# Patient Record
Sex: MALE | Race: WHITE | Employment: FULL TIME | URBAN - METROPOLITAN AREA
[De-identification: names, ages, dates, MRNs, and addresses within clinical notes are randomized per-mention and may not be internally consistent; named-entity substitution may affect disease eponyms.]

---

## 2020-06-02 ENCOUNTER — TELEPHONE (OUTPATIENT)
Dept: FAMILY MEDICINE CLINIC | Age: 45
End: 2020-06-02

## 2020-06-02 NOTE — TELEPHONE ENCOUNTER
----- Message from Doug Underwood sent at 6/2/2020  4:22 PM EDT -----  Regarding: /Telephone  Appointment not available    Caller's first and last name and relationship to patient (if not the patient):      Best contact number:  [phone]    Preferred date and time:  Tuesday or Thursday. ..early afternoon    Scheduled appointment date and time:  No appt scheduled    Reason for appointment:  New pt appt, would like a face to face visit    Details to clarify the request:  Pt states they have a hernia    Kaushik Underwood

## 2020-06-08 ENCOUNTER — OFFICE VISIT (OUTPATIENT)
Dept: FAMILY MEDICINE CLINIC | Age: 45
End: 2020-06-08

## 2020-06-08 VITALS
WEIGHT: 315 LBS | HEIGHT: 76 IN | HEART RATE: 94 BPM | RESPIRATION RATE: 20 BRPM | SYSTOLIC BLOOD PRESSURE: 145 MMHG | OXYGEN SATURATION: 95 % | TEMPERATURE: 97.6 F | BODY MASS INDEX: 38.36 KG/M2 | DIASTOLIC BLOOD PRESSURE: 92 MMHG

## 2020-06-08 DIAGNOSIS — Z13.1 DIABETES MELLITUS SCREENING: ICD-10-CM

## 2020-06-08 DIAGNOSIS — Z83.3 FAMILY HISTORY OF DIABETES MELLITUS (DM): ICD-10-CM

## 2020-06-08 DIAGNOSIS — R60.9 EDEMA, UNSPECIFIED TYPE: ICD-10-CM

## 2020-06-08 DIAGNOSIS — R63.5 WEIGHT GAIN: ICD-10-CM

## 2020-06-08 DIAGNOSIS — R43.0 LOSS OF SMELL: ICD-10-CM

## 2020-06-08 DIAGNOSIS — I10 ESSENTIAL HYPERTENSION: ICD-10-CM

## 2020-06-08 DIAGNOSIS — I10 ESSENTIAL HYPERTENSION: Primary | ICD-10-CM

## 2020-06-08 DIAGNOSIS — E66.01 OBESITY, MORBID (HCC): ICD-10-CM

## 2020-06-08 DIAGNOSIS — Z13.220 LIPID SCREENING: ICD-10-CM

## 2020-06-08 DIAGNOSIS — K42.9 UMBILICAL HERNIA WITHOUT OBSTRUCTION AND WITHOUT GANGRENE: ICD-10-CM

## 2020-06-08 RX ORDER — HYDROCHLOROTHIAZIDE 25 MG/1
25 TABLET ORAL DAILY
Qty: 30 TAB | Refills: 2 | Status: SHIPPED | OUTPATIENT
Start: 2020-06-08 | End: 2020-07-29

## 2020-06-08 NOTE — LETTER
NOTIFICATION RETURN TO WORK / SCHOOL 
 
6/8/2020 4:29 PM 
 
Mr. Bhavesh Wells 
[address] [facility] [address] To Whom It May Concern: 
 
Bhavesh Wells is currently under the care of Paco Taylor. He will return to work/school on: Tuesday, 6/09/2020. Please excuse from work today for doctor's appointment. If there are questions or concerns please have the patient contact our office. Sincerely, Rashida Mccray PA-C

## 2020-06-08 NOTE — PATIENT INSTRUCTIONS
Learning About the [address] [facility] Diet What is the Mediterranean diet? The Mediterranean diet is a style of eating rather than a diet plan. It features foods eaten in [place], [place], [place] and [place], and other countries along the [facility]. It emphasizes eating foods like fish, fruits, vegetables, beans, high-fiber breads and whole grains, nuts, and olive oil. This style of eating includes limited red meat, cheese, and sweets. Why choose the Mediterranean diet? A Mediterranean-style diet may improve heart health. It contains more fat than other heart-healthy diets. But the fats are mainly from nuts, unsaturated oils (such as fish oils and olive oil), and certain nut or seed oils (such as canola, soybean, or flaxseed oil). These fats may help protect the heart and blood vessels. How can you get started on the Mediterranean diet? Here are some things you can do to switch to a more Mediterranean way of eating. What to eat · Eat a variety of fruits and vegetables each day, such as grapes, blueberries, tomatoes, broccoli, peppers, figs, olives, spinach, eggplant, beans, lentils, and chickpeas. · Eat a variety of whole-grain foods each day, such as oats, brown rice, and whole wheat bread, pasta, and couscous. · Eat fish at least 2 times a week. Try tuna, salmon, mackerel, lake trout, herring, or sardines. · Eat moderate amounts of low-fat dairy products, such as milk, cheese, or yogurt. · Eat moderate amounts of poultry and eggs. · Choose healthy (unsaturated) fats, such as nuts, olive oil, and certain nut or seed oils like canola, soybean, and flaxseed. · Limit unhealthy (saturated) fats, such as butter, palm oil, and coconut oil. And limit fats found in animal products, such as meat and dairy products made with whole milk. Try to eat red meat only a few times a month in very small amounts. · Limit sweets and desserts to only a few times a week.  This includes sugar-sweetened drinks like soda. The Mediterranean diet may also include red wine with your meal1 glass each day for women and up to 2 glasses a day for men. Tips for eating at home · Use herbs, spices, garlic, lemon zest, and citrus juice instead of salt to add flavor to foods. · Add avocado slices to your sandwich instead of donaldson. · Have fish for lunch or dinner instead of red meat. Brush the fish with olive oil, and broil or grill it. · Sprinkle your salad with seeds or nuts instead of cheese. · Cook with olive or canola oil instead of butter or oils that are high in saturated fat. · Switch from 2% milk or whole milk to 1% or fat-free milk. · Dip raw vegetables in a vinaigrette dressing or hummus instead of dips made from mayonnaise or sour cream. 
· Have a piece of fruit for dessert instead of a piece of cake. Try baked apples, or have some dried fruit. Tips for eating out · Try broiled, grilled, baked, or poached fish instead of having it fried or breaded. · Ask your  to have your meals prepared with olive oil instead of butter. · Order dishes made with marinara sauce or sauces made from olive oil. Avoid sauces made from cream or mayonnaise. · Choose whole-grain breads, whole wheat pasta and pizza crust, brown rice, beans, and lentils. · Cut back on butter or margarine on bread. Instead, you can dip your bread in a small amount of olive oil. · Ask for a side salad or grilled vegetables instead of french fries or chips. Where can you learn more? Go to http://socorro-kurt.info/ Enter [phone] in the search box to learn more about \"Learning About the Mediterranean Diet. \" Current as of: August 22, 2019               Content Version: 12.5 © 0329-1763 Healthwise, Incorporated. Care instructions adapted under license by [employer] (which disclaims liability or warranty for this information).  If you have questions about a medical condition or this instruction, always ask your healthcare professional. [address] any warranty or liability for your use of this information. Low Sodium Diet (2,000 Milligram): Care Instructions Your Care Instructions Too much sodium causes your body to hold on to extra water. This can raise your blood pressure and force your heart and kidneys to work harder. In very serious cases, this could cause you to be put in the hospital. It might even be life-threatening. By limiting sodium, you will feel better and lower your risk of serious problems. The most common source of sodium is salt. People get most of the salt in their diet from canned, prepared, and packaged foods. Fast food and restaurant meals also are very high in sodium. Your doctor will probably limit your sodium to less than 2,000 milligrams (mg) a day. This limit counts all the sodium in prepared and packaged foods and any salt you add to your food. Follow-up care is a key part of your treatment and safety. Be sure to make and go to all appointments, and call your doctor if you are having problems. It's also a good idea to know your test results and keep a list of the medicines you take. How can you care for yourself at home? Read food labels · Read labels on cans and food packages. The labels tell you how much sodium is in each serving. Make sure that you look at the serving size. If you eat more than the serving size, you have eaten more sodium. · Food labels also tell you the Percent Daily Value for sodium. Choose products with low Percent Daily Values for sodium. · Be aware that sodium can come in forms other than salt, including monosodium glutamate (MSG), sodium citrate, and sodium bicarbonate (baking soda). MSG is often added to Asian food. When you eat out, you can sometimes ask for food without MSG or added salt. Buy low-sodium foods · Buy foods that are labeled \"unsalted\" (no salt added), \"sodium-free\" (less than 5 mg of sodium per serving), or \"low-sodium\" (less than 140 mg of sodium per serving). Foods labeled \"reduced-sodium\" and \"light sodium\" may still have too much sodium. Be sure to read the label to see how much sodium you are getting. · Buy fresh vegetables, or frozen vegetables without added sauces. Buy low-sodium versions of canned vegetables, soups, and other canned goods. Prepare low-sodium meals · Cut back on the amount of salt you use in cooking. This will help you adjust to the taste. Do not add salt after cooking. One teaspoon of salt has about 2,300 mg of sodium. · Take the salt shaker off the table. · Flavor your food with garlic, lemon juice, onion, vinegar, herbs, and spices. Do not use soy sauce, lite soy sauce, steak sauce, onion salt, garlic salt, celery salt, mustard, or ketchup on your food. · Use low-sodium salad dressings, sauces, and ketchup. Or make your own salad dressings and sauces without adding salt. · Use less salt (or none) when recipes call for it. You can often use half the salt a recipe calls for without losing flavor. Other foods such as rice, pasta, and grains do not need added salt. · Rinse canned vegetables, and cook them in fresh water. This removes somebut not allof the salt. · Avoid water that is naturally high in sodium or that has been treated with water softeners, which add sodium. Call your local water company to find out the sodium content of your water supply. If you buy bottled water, read the label and choose a sodium-free brand. Avoid high-sodium foods · Avoid eating: 
? Smoked, cured, salted, and canned meat, fish, and poultry. ? Ham, donaldson, hot dogs, and luncheon meats. ? Regular, hard, and processed cheese and regular peanut butter. ? Crackers with salted tops, and other salted snack foods such as pretzels, chips, and salted popcorn. ? Frozen prepared meals, unless labeled low-sodium. ? Canned and dried soups, broths, and bouillon, unless labeled sodium-free or low-sodium. ? Canned vegetables, unless labeled sodium-free or low-sodium. ? [place] Serena fries, pizza, tacos, and other fast foods. ? Pickles, olives, ketchup, and other condiments, especially soy sauce, unless labeled sodium-free or low-sodium. Where can you learn more? Go to http://socorro-kurt.info/ Enter Z918 in the search box to learn more about \"Low Sodium Diet (2,000 Milligram): Care Instructions. \" Current as of: August 22, 2019               Content Version: 12.5 © 2464-6748 Healthwise, Incorporated. Care instructions adapted under license by [employer] (which disclaims liability or warranty for this information). If you have questions about a medical condition or this instruction, always ask your healthcare professional. [address] any warranty or liability for your use of this information.

## 2020-06-08 NOTE — PROGRESS NOTES
HPI:  40 y.o.  presents as new patient to establish care. No prior PCP. Has not had physical or labs any many years. He was born in [place] and lives in [place]. He is  and has 3 children, ages 22, 23, and 15. 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 Does not follow a particular diet. Does walking around his property, owns quite a few acres. Weight gain - noted over the last year, feels fullness under arms, lateral thorax, and in stomach,  on scale at home noticed about 50 lbs in the last 1 - 1.5 yrs. States has reduced his soft drinks and is trying to eat healthier over the last month, used to drink 6 - 12-ounce cans of real Coke per day, now may have 1 daily. Is drinking sugar-free Powerade, some water daily    Hernia - noticed his navel \"popped out\" one yr ago, and is painful when he picks up things or when touched; no prior abdominal surgeries    Feels bloated in his stomach. No gas. No ankle swelling at first ask, but on further discussion he does notice a \"dent\" in his lower legs from his sock line when he takes his shoes off at end of day. His clothes feel tighter. Noted 1 year ago had bad URI and laryngitis, and gradually lost sense of smell. Does not use any nasal sprays or zinc preparations. No known h/o thyroid disease. HTN - states he saw an MD in [place], Dr Ruthie Lindsay ?, a few yrs ago who diagnosed him with HTN, believes he took lisinopril briefly, but just didn't follow up; no CP or CARR on walking around his property; no orthopnea    He suspects his wife uses significant salt when she cooks, and he likes to add salt at the table as well. He will eat out about 3 times per week. Patient Active Problem List    Diagnosis    Obesity, morbid ([address]         Past Medical History:   Diagnosis Date    Hypertension        Social History     Tobacco Use    Smoking status: Never Smoker    Smokeless tobacco: Never Used   Substance Use Topics    Alcohol use:  Yes Alcohol/week: 1.0 standard drinks     Types: 1 Cans of beer per week     Frequency: Monthly or less     Drinks per session: 1 or 2     Binge frequency: Never     Comment: once in a blue moon    Drug use: No           No Known Allergies    ROS:  ROS negative except as per HPI. PE:  Visit Vitals  BP (!) 143/95 (BP 1 Location: Right arm, BP Patient Position: Sitting)   Pulse 94   Temp 97.6 °F (36.4 °C) (Temporal)   Resp 20   Ht 6' 4\" (1.93 m)   Wt (!) 352 lb (159.7 kg)   SpO2 95%   BMI 42.85 kg/m²     Gen: alert, oriented, no acute distress  Head: normocephalic, atraumatic  Ears: external auditory canals clear, TMs without erythema or effusion  Eyes: pupils equal round reactive to light, sclera clear, conjunctiva clear  Oral: moist mucus membranes, no oral lesions, no pharyngeal inflammation or exudate  Neck: symmetric normal sized thyroid, no carotid bruits, no jugular vein distention  Resp: no increase work of breathing, lungs clear to ausculation bilaterally, no wheezing, rales or rhonchi  CV: S1, S2 normal.  No murmurs, rubs, or gallops. Chest wall: bilateral skin folds under axillary area, no masses or cysts  Abd: soft, not tender, not distended. No hepatosplenomegaly. Normal bowel sounds. (+)umbilical hernia, tender, reducible     Neuro:  normal strength and movement in all extremities. Skin: no lesion or rash  Extremities: mild bilateral 1/2+ pitting ankle edema    No results found for this visit on 06/08/20. Assessment/Plan:    1. Essential hypertension    - METABOLIC PANEL, COMPREHENSIVE; Future  - CBC WITH AUTOMATED DIFF; Future  - LIPID PANEL; Future  - URINALYSIS W/MICROSCOPIC; Future  - XR CHEST PA LAT; Future  - hydroCHLOROthiazide (HYDRODIURIL) 25 mg tablet; Take 1 Tab by mouth daily. Dispense: 30 Tab;  Refill: 2  145/92  -has been on lisinopril in the past, but with current edema, will try HCTZ instead  -advised need labs within next couple months or can not continue to prescribe, as I have no baseline labs for review currently    2. Obesity, morbid ([address]  -notes 50 lb weight gain in 1-1.5yrs  -has cut out the 6 - 12 ounce real Cokes he would have daily, now drinking no more than 1 daily  -reviewed Mediterranean diet, handout given  -increase water intake  -consider referral to nutritionist  - TSH 3RD GENERATION; Future  - T4, FREE; Future  - TESTOSTERONE, FREE & TOTAL; Future  - HEMOGLOBIN A1C WITH EAG; Future  - VITAMIN D, 25 HYDROXY; Future    3. Edema, unspecified type  -diet sounds high in salt  -reviewed reading nutrition labels for sodium  -low salt diet handout given  -no CP, CARR, or orthopnea  -consider echo  - VITAMIN D, 25 HYDROXY; Future  - URINALYSIS W/MICROSCOPIC; Future  - XR CHEST PA LAT; Future  - hydroCHLOROthiazide (HYDRODIURIL) 25 mg tablet; Take 1 Tab by mouth daily. Dispense: 30 Tab; Refill: 2    4. Weight gain    - HEMOGLOBIN A1C WITH EAG; Future  - XR CHEST PA LAT; Future  - TSH 3RD GENERATION; Future    5. Loss of smell  Started about 1 yr ago after an URI    - TSH 3RD GENERATION; Future  - T4, FREE; Future  - TESTOSTERONE, FREE & TOTAL; Future  - VITAMIN D, 25 HYDROXY; Future  - XR CHEST PA LAT; Future    6. Family history of diabetes mellitus (DM)    - HEMOGLOBIN A1C WITH EAG; Future    7. Umbilical hernia without obstruction and without gangrene  -reviewed warning signs to go to the ER for, increase pain and/or unable to reduce  -no heavy lifting  - REFERRAL TO GENERAL SURGERY    8. Lipid screening  - LIPID PANEL; Future    9. Diabetes mellitus screening    - HEMOGLOBIN A1C WITH EAG; Future      Health Maintenance reviewed - updated. Medications Discontinued During This Encounter   Medication Reason    HYDROcodone-acetaminophen (NORCO) 5-325 mg per tablet Not A Current Medication         Follow-up and Dispositions    · Return in about 3 weeks (around 6/29/2020) for HTN. Recommended healthy diet low in carbohydrates, fats, sodium and cholesterol. Recommended regular cardiovascular exercise 3-6 times per week for 30-60 minutes daily. Verbal and written instructions (see AVS) provided. Patient expresses understanding of diagnosis and treatment plan.

## 2020-06-08 NOTE — PROGRESS NOTES
Chantal Hendricks. is a 40 y.o. male  HIPAA verified by two patient identifiers. Health Maintenance Due   Topic    DTaP/Tdap/Td series (1 - Tdap)    Lipid Screen      Chief Complaint   Patient presents with    Abdominal Pain     tender to touch,fluid build up of fluid under bilateral arms,loss sence of smell about one year ago     Visit Vitals  BP (!) 159/102 (BP 1 Location: Left arm, BP Patient Position: Sitting)   Pulse 94   Temp 97.6 °F (36.4 °C) (Temporal)   Resp 20   Ht 6' 4\" (1.93 m)   Wt (!) 352 lb (159.7 kg)   SpO2 95%   BMI 42.85 kg/m²       Pain Scale: 5/10  Pain Location: Abdomen  1. Have you been to the ER, urgent care clinic since your last visit? Hospitalized since your last visit? No    2. Have you seen or consulted any other health care providers outside of the [address] [place] [address] since your last visit? Include any pap smears or colon screening.  No

## 2020-10-02 DIAGNOSIS — R60.9 EDEMA, UNSPECIFIED TYPE: ICD-10-CM

## 2020-10-02 DIAGNOSIS — I10 ESSENTIAL HYPERTENSION: ICD-10-CM

## 2020-10-09 RX ORDER — HYDROCHLOROTHIAZIDE 25 MG/1
25 TABLET ORAL DAILY
Qty: 30 TAB | Refills: 0 | Status: SHIPPED | OUTPATIENT
Start: 2020-10-09 | End: 2020-11-04

## 2020-10-09 NOTE — TELEPHONE ENCOUNTER
Office visit/labs due before next refill  Please assist, lab orders are in the computer, can sched fasting lab visit here with in person visit 1 wk later

## 2020-11-10 NOTE — TELEPHONE ENCOUNTER
The patient scheduled his lab appointment for 11/20/2020 at 4pm but said he will call back  To schedule his in office visit due to his work schedule.

## 2020-11-14 DIAGNOSIS — R60.9 EDEMA, UNSPECIFIED TYPE: ICD-10-CM

## 2020-11-14 DIAGNOSIS — I10 ESSENTIAL HYPERTENSION: ICD-10-CM

## 2020-11-16 RX ORDER — HYDROCHLOROTHIAZIDE 25 MG/1
TABLET ORAL
Qty: 15 TAB | Refills: 0 | Status: SHIPPED | OUTPATIENT
Start: 2020-11-16 | End: 2020-11-30

## 2020-11-27 DIAGNOSIS — R60.9 EDEMA, UNSPECIFIED TYPE: ICD-10-CM

## 2020-11-27 DIAGNOSIS — I10 ESSENTIAL HYPERTENSION: ICD-10-CM

## 2020-11-30 RX ORDER — HYDROCHLOROTHIAZIDE 25 MG/1
TABLET ORAL
Qty: 15 TAB | Refills: 0 | Status: SHIPPED | OUTPATIENT
Start: 2020-11-30 | End: 2020-12-16 | Stop reason: SDUPTHER

## 2020-11-30 NOTE — TELEPHONE ENCOUNTER
Please schedule an in person office visit to review labs and for BP check. It looks like he has lab appt coming up 12/04/20, but needs visit soon after that as well. Thanks!

## 2020-12-07 LAB
25(OH)D3 SERPL-MCNC: 15.5 NG/ML (ref 30–100)
ALBUMIN SERPL-MCNC: 4.1 G/DL (ref 3.5–5)
ALBUMIN/GLOB SERPL: 1.1 {RATIO} (ref 1.1–2.2)
ALP SERPL-CCNC: 99 U/L (ref 45–117)
ALT SERPL-CCNC: 63 U/L (ref 12–78)
ANION GAP SERPL CALC-SCNC: 7 MMOL/L (ref 5–15)
AST SERPL-CCNC: 24 U/L (ref 15–37)
BASOPHILS # BLD: 0.1 K/UL (ref 0–0.1)
BASOPHILS NFR BLD: 1 % (ref 0–1)
BILIRUB SERPL-MCNC: 0.5 MG/DL (ref 0.2–1)
BUN SERPL-MCNC: 19 MG/DL (ref 6–20)
BUN/CREAT SERPL: 18 (ref 12–20)
CALCIUM SERPL-MCNC: 9.3 MG/DL (ref 8.5–10.1)
CHLORIDE SERPL-SCNC: 104 MMOL/L (ref 97–108)
CHOLEST SERPL-MCNC: 212 MG/DL
CO2 SERPL-SCNC: 29 MMOL/L (ref 21–32)
CREAT SERPL-MCNC: 1.06 MG/DL (ref 0.7–1.3)
DIFFERENTIAL METHOD BLD: ABNORMAL
EOSINOPHIL # BLD: 0.2 K/UL (ref 0–0.4)
EOSINOPHIL NFR BLD: 3 % (ref 0–7)
ERYTHROCYTE [DISTWIDTH] IN BLOOD BY AUTOMATED COUNT: 12.8 % (ref 11.5–14.5)
EST. AVERAGE GLUCOSE BLD GHB EST-MCNC: 160 MG/DL
GLOBULIN SER CALC-MCNC: 3.6 G/DL (ref 2–4)
GLUCOSE SERPL-MCNC: 149 MG/DL (ref 65–100)
HBA1C MFR BLD: 7.2 % (ref 4–5.6)
HCT VFR BLD AUTO: 44.2 % (ref 36.6–50.3)
HDLC SERPL-MCNC: 37 MG/DL
HDLC SERPL: 5.7 {RATIO} (ref 0–5)
HGB BLD-MCNC: 15 G/DL (ref 12.1–17)
IMM GRANULOCYTES # BLD AUTO: 0 K/UL (ref 0–0.04)
IMM GRANULOCYTES NFR BLD AUTO: 0 % (ref 0–0.5)
LDLC SERPL CALC-MCNC: 140 MG/DL (ref 0–100)
LIPID PROFILE,FLP: ABNORMAL
LYMPHOCYTES # BLD: 1.8 K/UL (ref 0.8–3.5)
LYMPHOCYTES NFR BLD: 21 % (ref 12–49)
MCH RBC QN AUTO: 26.5 PG (ref 26–34)
MCHC RBC AUTO-ENTMCNC: 33.9 G/DL (ref 30–36.5)
MCV RBC AUTO: 78 FL (ref 80–99)
MONOCYTES # BLD: 0.8 K/UL (ref 0–1)
MONOCYTES NFR BLD: 9 % (ref 5–13)
NEUTS SEG # BLD: 5.5 K/UL (ref 1.8–8)
NEUTS SEG NFR BLD: 66 % (ref 32–75)
NRBC # BLD: 0 K/UL (ref 0–0.01)
NRBC BLD-RTO: 0 PER 100 WBC
PLATELET # BLD AUTO: 277 K/UL (ref 150–400)
PMV BLD AUTO: 10.7 FL (ref 8.9–12.9)
POTASSIUM SERPL-SCNC: 4 MMOL/L (ref 3.5–5.1)
PROT SERPL-MCNC: 7.7 G/DL (ref 6.4–8.2)
RBC # BLD AUTO: 5.67 M/UL (ref 4.1–5.7)
SODIUM SERPL-SCNC: 140 MMOL/L (ref 136–145)
T4 FREE SERPL-MCNC: 1 NG/DL (ref 0.8–1.5)
TESTOST FREE SERPL-MCNC: 8.6 PG/ML (ref 6.8–21.5)
TESTOST SERPL-MCNC: 107 NG/DL (ref 264–916)
TRIGL SERPL-MCNC: 175 MG/DL (ref ?–150)
TSH SERPL DL<=0.05 MIU/L-ACNC: 1.52 UIU/ML (ref 0.36–3.74)
VLDLC SERPL CALC-MCNC: 35 MG/DL
WBC # BLD AUTO: 8.3 K/UL (ref 4.1–11.1)

## 2020-12-16 DIAGNOSIS — R60.9 EDEMA, UNSPECIFIED TYPE: ICD-10-CM

## 2020-12-16 DIAGNOSIS — I10 ESSENTIAL HYPERTENSION: ICD-10-CM

## 2020-12-16 RX ORDER — HYDROCHLOROTHIAZIDE 25 MG/1
TABLET ORAL
Qty: 90 TAB | Refills: 0 | Status: SHIPPED | OUTPATIENT
Start: 2020-12-16 | End: 2021-03-01 | Stop reason: SDUPTHER

## 2020-12-17 NOTE — PROGRESS NOTES
[employer] message sent  I will discuss your lab results in detail at your upcoming visit. Of note, your blood sugar is elevated in the range of diabetes. I will send a separate message with dietary tips to follow until your appointment, and review starting medication at your appointment. The cholesterol is elevated. The vitamin D level and testosterone are low. You are about to run out of your HCTZ blood pressure medication, so I have sent a refill to your pharmacy. Your electrolytes and kidney function are normal.     I look forward to seeing you at your visit.

## 2021-01-11 ENCOUNTER — OFFICE VISIT (OUTPATIENT)
Dept: FAMILY MEDICINE CLINIC | Age: 46
End: 2021-01-11
Payer: COMMERCIAL

## 2021-01-11 VITALS
WEIGHT: 315 LBS | DIASTOLIC BLOOD PRESSURE: 89 MMHG | HEIGHT: 75 IN | OXYGEN SATURATION: 95 % | RESPIRATION RATE: 16 BRPM | BODY MASS INDEX: 39.17 KG/M2 | HEART RATE: 80 BPM | SYSTOLIC BLOOD PRESSURE: 153 MMHG | TEMPERATURE: 97.5 F

## 2021-01-11 DIAGNOSIS — E55.9 VITAMIN D DEFICIENCY: ICD-10-CM

## 2021-01-11 DIAGNOSIS — E11.9 NEW ONSET TYPE 2 DIABETES MELLITUS (HCC): Primary | ICD-10-CM

## 2021-01-11 DIAGNOSIS — I10 ESSENTIAL HYPERTENSION: ICD-10-CM

## 2021-01-11 DIAGNOSIS — N52.9 ERECTILE DYSFUNCTION, UNSPECIFIED ERECTILE DYSFUNCTION TYPE: ICD-10-CM

## 2021-01-11 DIAGNOSIS — E66.01 OBESITY, MORBID (HCC): ICD-10-CM

## 2021-01-11 DIAGNOSIS — E34.9 TESTOSTERONE DEFICIENCY: ICD-10-CM

## 2021-01-11 DIAGNOSIS — E78.2 MIXED HYPERLIPIDEMIA: ICD-10-CM

## 2021-01-11 PROCEDURE — 99214 OFFICE O/P EST MOD 30 MIN: CPT | Performed by: PHYSICIAN ASSISTANT

## 2021-01-11 RX ORDER — ERGOCALCIFEROL 1.25 MG/1
50000 CAPSULE ORAL
Qty: 13 CAP | Refills: 0 | Status: SHIPPED | OUTPATIENT
Start: 2021-01-11 | End: 2021-04-11

## 2021-01-11 RX ORDER — LISINOPRIL 10 MG/1
10 TABLET ORAL DAILY
Qty: 90 TAB | Refills: 0 | Status: SHIPPED | OUTPATIENT
Start: 2021-01-11 | End: 2021-04-06 | Stop reason: SDUPTHER

## 2021-01-11 RX ORDER — ATORVASTATIN CALCIUM 20 MG/1
20 TABLET, FILM COATED ORAL DAILY
Qty: 90 TAB | Refills: 0 | Status: SHIPPED | OUTPATIENT
Start: 2021-01-11 | End: 2021-04-06 | Stop reason: SDUPTHER

## 2021-01-11 RX ORDER — METFORMIN HYDROCHLORIDE 500 MG/1
TABLET, EXTENDED RELEASE ORAL
Qty: 120 TAB | Refills: 2 | Status: SHIPPED | OUTPATIENT
Start: 2021-01-11 | End: 2021-05-03

## 2021-01-11 NOTE — PROGRESS NOTES
HPI:  39 y.o.  presents for follow up appointment. No hospital, ER or specialist visits since last primary care visit except as noted below. Had labs done on 12/04/20, discussing results in detail today    Hypertension - compliant with medication, HCTZ 25 mg daily,  no home monitoring. No history of heart disease or stroke. No chest pain, no shortness of breath, no headaches, no lower extremity swelling. DM - new onset, labs 12/04/2020 show A1C 7.2%  He lost 14 lbs since last visit, and is eating healthier    HLD - , not on statin    Vit D deficiency- D 15.5, not on suppl    Testosterone low - he has felt very sluggish and tired after work, has been having erectile dysfunction    Patient Active Problem List    Diagnosis    Obesity, morbid ([address]         Past Medical History:   Diagnosis Date    GERD (gastroesophageal reflux disease)     rare use of omeprazole    Hypertension        Social History     Tobacco Use    Smoking status: Never Smoker    Smokeless tobacco: Never Used   Substance Use Topics    Alcohol use: Yes     Alcohol/week: 1.0 standard drinks     Types: 1 Cans of beer per week     Frequency: Monthly or less     Drinks per session: 1 or 2     Binge frequency: Never     Comment: once in a blue moon    Drug use: No       Outpatient Medications Marked as Taking for the 1/11/21 encounter (Office Visit) with Jacy Spivey, Rashida DAHL PA-C   Medication Sig Dispense Refill    hydroCHLOROthiazide (HYDRODIURIL) 25 mg tablet TAKE 1 TABLET BY MOUTH DAILY. LABS AND FOLLOW UP VISIT ARE DUE NOW. 90 Tab 0       No Known Allergies    ROS:  ROS negative except as per HPI.       PE:  Visit Vitals  BP (!) 153/89 (BP 1 Location: Left arm, BP Patient Position: Sitting)   Pulse 80   Temp 97.5 °F (36.4 °C) (Oral)   Resp 16   Ht 6' 3\" (1.905 m)   Wt 338 lb (153.3 kg)   SpO2 95%   BMI 42.25 kg/m²     Weight down 14 lbs from last visit in June 2020    Gen: alert, oriented, no acute distress  Head: normocephalic, atraumatic  Eyes: pupils equal round reactive to light, sclera clear, conjunctiva clear  Oral: mask on  Neck: supple  Resp: no increase work of breathing, lungs clear to ausculation bilaterally, no wheezing, rales or rhonchi  CV: S1, S2 normal.  No murmurs, rubs, or gallops.   Skin: no lesion or rash  Extremities: no cyanosis or edema    No results found for this visit on 01/11/21.    Lab Results   Component Value Date/Time    WBC 8.3 12/04/2020 03:52 PM    HGB 15.0 12/04/2020 03:52 PM    HCT 44.2 12/04/2020 03:52 PM    PLATELET 277 12/04/2020 03:52 PM    MCV 78.0 (L) 12/04/2020 03:52 PM     Lab Results   Component Value Date/Time    Sodium 140 12/04/2020 03:52 PM    Potassium 4.0 12/04/2020 03:52 PM    Chloride 104 12/04/2020 03:52 PM    CO2 29 12/04/2020 03:52 PM    Anion gap 7 12/04/2020 03:52 PM    Glucose 149 (H) 12/04/2020 03:52 PM    BUN 19 12/04/2020 03:52 PM    Creatinine 1.06 12/04/2020 03:52 PM    BUN/Creatinine ratio 18 12/04/2020 03:52 PM    GFR est AA >60 12/04/2020 03:52 PM    GFR est non-AA >60 12/04/2020 03:52 PM    Calcium 9.3 12/04/2020 03:52 PM    Bilirubin, total 0.5 12/04/2020 03:52 PM    Alk. phosphatase 99 12/04/2020 03:52 PM    Protein, total 7.7 12/04/2020 03:52 PM    Albumin 4.1 12/04/2020 03:52 PM    Globulin 3.6 12/04/2020 03:52 PM    A-G Ratio 1.1 12/04/2020 03:52 PM    ALT (SGPT) 63 12/04/2020 03:52 PM    AST (SGOT) 24 12/04/2020 03:52 PM     Lab Results   Component Value Date/Time    Hemoglobin A1c 7.2 (H) 12/04/2020 03:52 PM     Lab Results   Component Value Date/Time    Cholesterol, total 212 (H) 12/04/2020 03:52 PM    HDL Cholesterol 37 12/04/2020 03:52 PM    LDL, calculated 140 (H) 12/04/2020 03:52 PM    VLDL, calculated 35 12/04/2020 03:52 PM    Triglyceride 175 (H) 12/04/2020 03:52 PM    CHOL/HDL Ratio 5.7 (H) 12/04/2020 03:52 PM     Lab Results   Component Value Date/Time    TSH 1.52 12/04/2020 03:52 PM     Lab Results   Component Value Date/Time    Vitamin D 25-Hydroxy 15.5  (L) 12/04/2020 03:52 PM       Lab Results   Component Value Date/Time    Testosterone 107 (L) 12/04/2020 03:52 PM           Assessment/Plan:    1. New onset type 2 diabetes mellitus ([address]  -new onset, labs 12/04/2020 show A1C 7.2%  -new start metformin, ACE-I, and statin  -Next visit, check BMP, microalbumin, foot exam  -Pt to schedule eye exam  -handouts given, reviewed diet, referred to DEP  - lisinopriL (PRINIVIL, ZESTRIL) 10 mg tablet; Take 1 Tab by mouth daily. Dispense: 90 Tab; Refill: 0  - metFORMIN ER (GLUCOPHAGE XR) 500 mg tablet; 1 tab po with dinner x 7d, then 1 tab po BID with meal x 7d, then 2 tab po dinner and 1 tab po breakfast x 7d, then 2 tab po BID with meals and stay at this dose  Dispense: 120 Tab; Refill: 2  - REFERRAL TO DIABETIC EDUCATION; Standing    2. Essential hypertension  153/89  -currently on HCTZ 25 mg  -will add ACE-I with new onset DM (recheck BMP next visit after new start ACE-I)  - lisinopriL (PRINIVIL, ZESTRIL) 10 mg tablet; Take 1 Tab by mouth daily. Dispense: 90 Tab; Refill: 0    3. Vitamin D deficiency  Vit D 15.5, no on suppl    - ergocalciferol (ERGOCALCIFEROL) 1,250 mcg (50,000 unit) capsule; Take 1 Cap by mouth every seven (7) days for 90 days. Dispense: 13 Cap; Refill: 0    4. Mixed hyperlipidemia  , goal LDL <70 now  New onset DM  -new start statin, will recheck lipids and hepatic panel 3 mos  - atorvastatin (LIPITOR) 20 mg tablet; Take 1 Tab by mouth daily. Dispense: 90 Tab; Refill: 0    5. Testosterone deficiency  he has felt very sluggish and tired after work, has been having erectile dysfunction  Testosterone 107  - REFERRAL TO ENDOCRINOLOGY    6. Erectile dysfunction, unspecified erectile dysfunction type  Testosterone 107  - REFERRAL TO ENDOCRINOLOGY    7. Obesity, morbid ([address]  Review diabetic diet  Praised his success at weight loss since last visit  Down 14 lbs from June 2020! Health Maintenance reviewed - updated.     Orders Placed This Encounter  REFERRAL TO DIABETES EDUCATION [facility] [place]     Standing Status:   Standing     Number of Occurrences:   5     Standing Expiration Date:   2022     Referral Priority:   Routine     Referral Type:   Consultation     Referral Reason:   Specialty Services Required     Number of Visits Requested:   [facility] Endocrinology ref [facility]     Referral Priority:   Routine     Referral Type:   Consultation     Referral Reason:   Specialty Services Required     Referred to Provider:   Ruth Ann Griffin MD     Number of Visits Requested:   1    lisinopriL (PRINIVIL, ZESTRIL) 10 mg tablet     Sig: Take 1 Tab by mouth daily. Dispense:  90 Tab     Refill:  0    metFORMIN ER (GLUCOPHAGE XR) 500 mg tablet     Si tab po with dinner x 7d, then 1 tab po BID with meal x 7d, then 2 tab po dinner and 1 tab po breakfast x 7d, then 2 tab po BID with meals and stay at this dose     Dispense:  120 Tab     Refill:  2    ergocalciferol (ERGOCALCIFEROL) 1,250 mcg (50,000 unit) capsule     Sig: Take 1 Cap by mouth every seven (7) days for 90 days. Dispense:  13 Cap     Refill:  0    atorvastatin (LIPITOR) 20 mg tablet     Sig: Take 1 Tab by mouth daily. Dispense:  90 Tab     Refill:  0       There are no discontinued medications. Current Outpatient Medications   Medication Sig Dispense Refill    lisinopriL (PRINIVIL, ZESTRIL) 10 mg tablet Take 1 Tab by mouth daily. 90 Tab 0    metFORMIN ER (GLUCOPHAGE XR) 500 mg tablet 1 tab po with dinner x 7d, then 1 tab po BID with meal x 7d, then 2 tab po dinner and 1 tab po breakfast x 7d, then 2 tab po BID with meals and stay at this dose 120 Tab 2    ergocalciferol (ERGOCALCIFEROL) 1,250 mcg (50,000 unit) capsule Take 1 Cap by mouth every seven (7) days for 90 days. 13 Cap 0    atorvastatin (LIPITOR) 20 mg tablet Take 1 Tab by mouth daily. 90 Tab 0    hydroCHLOROthiazide (HYDRODIURIL) 25 mg tablet TAKE 1 TABLET BY MOUTH DAILY.  LABS AND FOLLOW UP VISIT ARE DUE NOW. 90 Tab 0       Recommended healthy diet low in carbohydrates, fats, sodium and cholesterol. Recommended regular cardiovascular exercise 3-6 times per week for 30-60 minutes daily. Verbal and written instructions (see AVS) provided. Patient expresses understanding of diagnosis and treatment plan. Follow-up and Dispositions    · Return in about 4 weeks (around 2/8/2021) for HTN.        Future Appointments   Date Time Provider Yani Calero   2/8/2021  4:00 PM Kale DAMONFP BS AMB   3/26/2021 11:30 AM Torrance Soulier, MD RDE [address] BS AMB

## 2021-01-11 NOTE — PATIENT INSTRUCTIONS
Learning About Diabetes Food Guidelines Your Care Instructions Meal planning is important to manage diabetes. It helps keep your blood sugar at a target level (which you set with your doctor). You don't have to eat special foods. You can eat what your family eats, including sweets once in a while. But you do have to pay attention to how often you eat and how much you eat of certain foods. You may want to work with a dietitian or a certified diabetes educator (CDE) to help you plan meals and snacks. A dietitian or CDE can also help you lose weight if that is one of your goals. What should you know about eating carbs? Managing the amount of carbohydrate (carbs) you eat is an important part of healthy meals when you have diabetes. Carbohydrate is found in many foods. · Learn which foods have carbs. And learn the amounts of carbs in different foods. ? Bread, cereal, pasta, and rice have about 15 grams of carbs in a serving. A serving is 1 slice of bread (1 ounce), ½ cup of cooked cereal, or 1/3 cup of cooked pasta or rice. ? Fruits have 15 grams of carbs in a serving. A serving is 1 small fresh fruit, such as an apple or orange; ½ of a banana; ½ cup of cooked or canned fruit; ½ cup of fruit juice; 1 cup of melon or raspberries; or 2 tablespoons of dried fruit. ? Milk and no-sugar-added yogurt have 15 grams of carbs in a serving. A serving is 1 cup of milk or 2/3 cup of no-sugar-added yogurt. ? Starchy vegetables have 15 grams of carbs in a serving. A serving is ½ cup of mashed potatoes or sweet potato; 1 cup winter squash; ½ of a small baked potato; ½ cup of cooked beans; or ½ cup cooked corn or green peas. · Learn how much carbs to eat each day and at each meal. A dietitian or CDE can teach you how to keep track of the amount of carbs you eat. This is called carbohydrate counting. · If you are not sure how to count carbohydrate grams, use the Plate Method to plan meals. It is a good, quick way to make sure that you have a balanced meal. It also helps you spread carbs throughout the day. ? Divide your plate by types of foods. Put non-starchy vegetables on half the plate, meat or other protein food on one-quarter of the plate, and a grain or starchy vegetable in the final quarter of the plate. To this you can add a small piece of fruit and 1 cup of milk or yogurt, depending on how many carbs you are supposed to eat at a meal. 
· Try to eat about the same amount of carbs at each meal. Do not \"save up\" your daily allowance of carbs to eat at one meal. 
· Proteins have very little or no carbs per serving. Examples of proteins are beef, chicken, turkey, fish, eggs, tofu, cheese, cottage cheese, and peanut butter. A serving size of meat is 3 ounces, which is about the size of a deck of cards. Examples of meat substitute serving sizes (equal to 1 ounce of meat) are 1/4 cup of cottage cheese, 1 egg, 1 tablespoon of peanut butter, and ½ cup of tofu. How can you eat out and still eat healthy? · Learn to estimate the serving sizes of foods that have carbohydrate. If you measure food at home, it will be easier to estimate the amount in a serving of restaurant food. · If the meal you order has too much carbohydrate (such as potatoes, corn, or baked beans), ask to have a low-carbohydrate food instead. Ask for a salad or green vegetables. · If you use insulin, check your blood sugar before and after eating out to help you plan how much to eat in the future. · If you eat more carbohydrate at a meal than you had planned, take a walk or do other exercise. This will help lower your blood sugar. What else should you know? · Limit saturated fat, such as the fat from meat and dairy products. This is a healthy choice because people who have diabetes are at higher risk of heart disease. So choose lean cuts of meat and nonfat or low-fat dairy products. Use olive or canola oil instead of butter or shortening when cooking. · Don't skip meals. Your blood sugar may drop too low if you skip meals and take insulin or certain medicines for diabetes. · Check with your doctor before you drink alcohol. Alcohol can cause your blood sugar to drop too low. Alcohol can also cause a bad reaction if you take certain diabetes medicines. Follow-up care is a key part of your treatment and safety. Be sure to make and go to all appointments, and call your doctor if you are having problems. It's also a good idea to know your test results and keep a list of the medicines you take. Where can you learn more? Go to http://www.gray.com/ Enter N559 in the search box to learn more about \"Learning About Diabetes Food Guidelines. \" Current as of: December 20, 2019               Content Version: 12.6 © 1243-6636 [employer]. Care instructions adapted under license by [employer] (which disclaims liability or warranty for this information). If you have questions about a medical condition or this instruction, always ask your healthcare professional. [address] any warranty or liability for your use of this information. Learning About Meal Planning for Diabetes Why plan your meals? Meal planning can be a key part of managing diabetes. Planning meals and snacks with the right balance of carbohydrate, protein, and fat can help you keep your blood sugar at the target level you set with your doctor. You don't have to eat special foods. You can eat what your family eats, including sweets once in a while. But you do have to pay attention to how often you eat and how much you eat of certain foods. You may want to work with a dietitian or a certified diabetes educator. He or she can give you tips and meal ideas and can answer your questions about meal planning. This health professional can also help you reach a healthy weight if that is one of your goals. What plan is right for you? Your dietitian or diabetes educator may suggest that you start with the plate format or carbohydrate counting. The plate format The plate format is a simple way to help you manage how you eat. You plan meals by learning how much space each food should take on a plate. Using the plate format helps you spread carbohydrate throughout the day. It can make it easier to keep your blood sugar level within your target range. It also helps you see if you're eating healthy portion sizes. To use the plate format, you put non-starchy vegetables on half your plate. Add meat or meat substitutes on one-quarter of the plate. Put a grain or starchy vegetable (such as brown rice or a potato) on the final quarter of the plate. You can add a small piece of fruit and some low-fat or fat-free milk or yogurt, depending on your carbohydrate goal for each meal. 
Here are some tips for using the plate format: · Make sure that you are not using an oversized plate. A 9-inch plate is best. Many restaurants use larger plates. · Get used to using the plate format at home. Then you can use it when you eat out. · Write down your questions about using the plate format. Talk to your doctor, a dietitian, or a diabetes educator about your concerns. Carbohydrate counting With carbohydrate counting, you plan meals based on the amount of carbohydrate in each food. Carbohydrate raises blood sugar higher and more quickly than any other nutrient. It is found in desserts, breads and cereals, and fruit. It's also found in starchy vegetables such as potatoes and corn, grains such as rice and pasta, and milk and yogurt. Spreading carbohydrate throughout the day helps keep your blood sugar levels within your target range. Your daily amount depends on several things, including your weight, how active you are, which diabetes medicines you take, and what your goals are for your blood sugar levels. A registered dietitian or diabetes educator can help you plan how much carbohydrate to include in each meal and snack. A guideline for your daily amount of carbohydrate is: · 45 to 60 grams at each meal. That's about the same as 3 to 4 carbohydrate servings. · 15 to 20 grams at each snack. That's about the same as 1 carbohydrate serving. The Nutrition Facts label on packaged foods tells you how much carbohydrate is in a serving of the food. First, look at the serving size on the food label. Is that the amount you eat in a serving? All of the nutrition information on a food label is based on that serving size. So if you eat more or less than that, you'll need to adjust the other numbers. Total carbohydrate is the next thing you need to look for on the label. If you count carbohydrate servings, one serving of carbohydrate is 15 grams. For foods that don't come with labels, such as fresh fruits and vegetables, you'll need a guide that lists carbohydrate in these foods. Ask your doctor, dietitian, or diabetes educator about books or other nutrition guides you can use. If you take insulin, you need to know how many grams of carbohydrate are in a meal. This lets you know how much rapid-acting insulin to take before you eat. If you use an insulin pump, you get a constant rate of insulin during the day. So the pump must be programmed at meals to give you extra insulin to cover the rise in blood sugar after meals. When you know how much carbohydrate you will eat, you can take the right amount of insulin. Or, if you always use the same amount of insulin, you need to make sure that you eat the same amount of carbohydrate at meals. If you need more help to understand carbohydrate counting and food labels, ask your doctor, dietitian, or diabetes educator. How do you get started with meal planning? Here are some tips to get started: 
· Plan your meals a week at a time. Don't forget to include snacks too. · Use cookbooks or online recipes to plan several main meals. Plan some quick meals for busy nights. You also can double some recipes that freeze well. Then you can save half for other busy nights when you don't have time to cook. · Make sure you have the ingredients you need for your recipes. If you're running low on basic items, put these items on your shopping list too. · List foods that you use to make breakfasts, lunches, and snacks. List plenty of fruits and vegetables. · Post this list on the refrigerator. Add to it as you think of more things you need. · Take the list to the store to do your weekly shopping. Follow-up care is a key part of your treatment and safety. Be sure to make and go to all appointments, and call your doctor if you are having problems. It's also a good idea to know your test results and keep a list of the medicines you take. Where can you learn more? Go to http://www.gray.com/ Jay Jay Sinha in the search box to learn more about \"Learning About Meal Planning for Diabetes. \" 
 Current as of: December 20, 2019               Content Version: 12.6 © 3772-8066 [employer]. Care instructions adapted under license by [employer] (which disclaims liability or warranty for this information). If you have questions about a medical condition or this instruction, always ask your healthcare professional. [address] any warranty or liability for your use of this information. Your vitamin D is low. I will prescribe you weekly vitamin D called ergocalciferol 50,000 units; take this once weekly for 3 months. After you have completed the prescription for 3 months, then take Vitamin D3 2000 units once daily obtained over-the-counter and stay on this as a long-term daily supplement. We should recheck you levels in 6 months.

## 2021-01-11 NOTE — PROGRESS NOTES
Room: 9    Identified pt with two pt identifiers(name and ). Reviewed record in preparation for visit and have obtained necessary documentation. All patient medications has been reviewed. Chief Complaint   Patient presents with    Labs    Hypertension       Health Maintenance Due   Topic    Foot Exam Q1     MICROALBUMIN Q1     Eye Exam Retinal or Dilated     DTaP/Tdap/Td series (1 - Tdap)    Flu Vaccine (1)     Last eye exam: siddharth next mo     TDAP: overdue     Flu: overdue     Vitals:    21 1540   BP: (!) 153/89   Pulse: 80   Resp: 16   Temp: 97.5 °F (36.4 °C)   TempSrc: Oral   SpO2: 95%   Weight: 338 lb (153.3 kg)   Height: 6' 3\" (1.905 m)   PainSc:   0 - No pain       4. Have you been to the ER, urgent care clinic since your last visit? Hospitalized since your last visit? No    5. Have you seen or consulted any other health care providers outside of the [address] [place] [address] since your last visit? Include any pap smears or colon screening. No      Patient is accompanied by self I have received verbal consent from Janel Walsh. to discuss any/all medical information while they are present in the room.

## 2021-03-01 ENCOUNTER — OFFICE VISIT (OUTPATIENT)
Dept: FAMILY MEDICINE CLINIC | Age: 46
End: 2021-03-01
Payer: COMMERCIAL

## 2021-03-01 VITALS
TEMPERATURE: 97.9 F | WEIGHT: 315 LBS | HEIGHT: 75 IN | DIASTOLIC BLOOD PRESSURE: 75 MMHG | RESPIRATION RATE: 16 BRPM | SYSTOLIC BLOOD PRESSURE: 125 MMHG | BODY MASS INDEX: 39.17 KG/M2 | OXYGEN SATURATION: 97 % | HEART RATE: 92 BPM

## 2021-03-01 DIAGNOSIS — R60.9 EDEMA, UNSPECIFIED TYPE: ICD-10-CM

## 2021-03-01 DIAGNOSIS — I10 ESSENTIAL HYPERTENSION: ICD-10-CM

## 2021-03-01 DIAGNOSIS — Z82.49 FAMILY HISTORY OF PREMATURE CAD: ICD-10-CM

## 2021-03-01 DIAGNOSIS — E78.2 MIXED HYPERLIPIDEMIA: ICD-10-CM

## 2021-03-01 DIAGNOSIS — E11.9 NEW ONSET TYPE 2 DIABETES MELLITUS (HCC): Primary | ICD-10-CM

## 2021-03-01 PROCEDURE — 99213 OFFICE O/P EST LOW 20 MIN: CPT | Performed by: PHYSICIAN ASSISTANT

## 2021-03-01 RX ORDER — HYDROCHLOROTHIAZIDE 25 MG/1
TABLET ORAL
Qty: 90 TAB | Refills: 1 | Status: SHIPPED | OUTPATIENT
Start: 2021-03-01 | End: 2021-03-23 | Stop reason: SDUPTHER

## 2021-03-01 NOTE — PROGRESS NOTES
HPI:  39 y.o.  presents for follow up appointment. No hospital, ER or specialist visits since last primary care visit except as noted below. DM - new start metformin XR at last visit 1/11/21, A1C 7.2%; diarrhea on first dose, he did titrate up to the 2 pills BID and was having 3+ loose stools daily with nausea. Is better tolerable at 1 pill BID with perhaps 2 loose stools, but is asking about change in therapy, Prefers pills, not injections    HTN - on HCTZ 25 mg and lisinopril 10 mg (added at last visit with good tolerance) for /89, today /75    Dyslipidemia - , atorvastatin 20 mg added at last visit with good tolerance        Patient Active Problem List    Diagnosis    Obesity, morbid ([address]         Past Medical History:   Diagnosis Date    GERD (gastroesophageal reflux disease)     rare use of omeprazole    Hypertension        Social History     Tobacco Use    Smoking status: Never Smoker    Smokeless tobacco: Never Used   Substance Use Topics    Alcohol use: Yes     Alcohol/week: 1.0 standard drinks     Types: 1 Cans of beer per week     Frequency: Monthly or less     Drinks per session: 1 or 2     Binge frequency: Never     Comment: once in a blue moon    Drug use: No       Outpatient Medications Marked as Taking for the 3/1/21 encounter (Office Visit) with Jose Eduardo Upton, Rashida DAHL, PAYasmeenC   Medication Sig Dispense Refill    lisinopriL (PRINIVIL, ZESTRIL) 10 mg tablet Take 1 Tab by mouth daily.  90 Tab 0    metFORMIN ER (GLUCOPHAGE XR) 500 mg tablet 1 tab po with dinner x 7d, then 1 tab po BID with meal x 7d, then 2 tab po dinner and 1 tab po breakfast x 7d, then 2 tab po BID with meals and stay at this dose (Patient taking differently: 1 tab po with dinner x 7d, then 1 tab po BID with meal x 7d, then 2 tab po dinner and 1 tab po breakfast x 7d, then 2 tab po BID with meals and stay at this dose  Indications: taking 1 in the am and 1 in the pm) 120 Tab 2    ergocalciferol (ERGOCALCIFEROL) 1,250 mcg (50,000 unit) capsule Take 1 Cap by mouth every seven (7) days for 90 days. 13 Cap 0    atorvastatin (LIPITOR) 20 mg tablet Take 1 Tab by mouth daily. 90 Tab 0    hydroCHLOROthiazide (HYDRODIURIL) 25 mg tablet TAKE 1 TABLET BY MOUTH DAILY. LABS AND FOLLOW UP VISIT ARE DUE NOW. 90 Tab 0       No Known Allergies    ROS:  ROS negative except as per HPI. PE:  Visit Vitals  /75 (BP 1 Location: Left upper arm, BP Patient Position: Sitting)   Pulse 92   Temp 97.9 °F (36.6 °C)   Resp 16   Ht 6' 3\" (1.905 m)   Wt 332 lb 3.2 oz (150.7 kg)   SpO2 97%   BMI 41.52 kg/m²     Gen: alert, oriented, no acute distress  Head: normocephalic, atraumatic  Eyes: pupils equal round reactive to light, sclera clear, conjunctiva clear  Neck: supple  Resp: no increase work of breathing   Skin: no lesion or rash  Extremities: no cyanosis or edema    No results found for this visit on 03/01/21. Assessment/Plan:      ICD-10-CM ICD-9-CM    1. New onset type 2 diabetes mellitus (HCC)  E11.9 250.00    2. Essential hypertension  I10 401.9 hydroCHLOROthiazide (HYDRODIURIL) 25 mg tablet   3. Mixed hyperlipidemia  E78.2 272.2    4. Edema, unspecified type  R60.9 782.3 hydroCHLOROthiazide (HYDRODIURIL) 25 mg tablet   5. Family history of premature CAD  Z82.49 V17.3      Diarrhea on metformin XR  Tolerating new ACE-I and statin  HTN - well controlled. Continue current medication. Exercise, and low salt/ low caffeine diet were discussed. Stop the metformin for 2 weeks. Then restart the metformin at 1 pill at dinner for 1 week, then after 1 week add 1 pill at breakfast so you are taking 1 pill twice a day with meals. Remember to schedule a diabetic eye exam    He does not wish for injections, so consider SGL2 antagonist due to strong cardiovascular benefits given his fam hx premature CAD, and benefit towards encouraging weight loss.     Next visit, check CMP, fasting lipids, A1C, microalbumin, foot exam        Health Maintenance reviewed - updated. No orders of the defined types were placed in this encounter. There are no discontinued medications. Current Outpatient Medications   Medication Sig Dispense Refill    lisinopriL (PRINIVIL, ZESTRIL) 10 mg tablet Take 1 Tab by mouth daily. 90 Tab 0    metFORMIN ER (GLUCOPHAGE XR) 500 mg tablet 1 tab po with dinner x 7d, then 1 tab po BID with meal x 7d, then 2 tab po dinner and 1 tab po breakfast x 7d, then 2 tab po BID with meals and stay at this dose (Patient taking differently: 1 tab po with dinner x 7d, then 1 tab po BID with meal x 7d, then 2 tab po dinner and 1 tab po breakfast x 7d, then 2 tab po BID with meals and stay at this dose  Indications: taking 1 in the am and 1 in the pm) 120 Tab 2    ergocalciferol (ERGOCALCIFEROL) 1,250 mcg (50,000 unit) capsule Take 1 Cap by mouth every seven (7) days for 90 days. 13 Cap 0    atorvastatin (LIPITOR) 20 mg tablet Take 1 Tab by mouth daily. 90 Tab 0    hydroCHLOROthiazide (HYDRODIURIL) 25 mg tablet TAKE 1 TABLET BY MOUTH DAILY. LABS AND FOLLOW UP VISIT ARE DUE NOW. 90 Tab 0       Recommended healthy diet low in carbohydrates, fats, sodium and cholesterol. Recommended regular cardiovascular exercise 3-6 times per week for 30-60 minutes daily. Verbal and written instructions (see AVS) provided. Patient expresses understanding of diagnosis and treatment plan. Follow-up and Dispositions    · Return in about 30 days (around 3/31/2021) for DM.        Future Appointments   Date Time Provider Yani Calero   3/26/2021 11:30 AM Amarilys Monson MD [facility] [address] [facility]   3/31/2021  3:30 PM Rashida Wynn PA-C [facility]

## 2021-03-01 NOTE — PROGRESS NOTES
Chief Complaint   Patient presents with    Hypertension     4 week f/u     Room 11    Pt states he has been taking the Metformin since January and he is having diarrhea. Pt states he had to cut back on the medication, because he was unable to stay out of the restroom. 1. Have you been to the ER, urgent care clinic since your last visit? Hospitalized since your last visit? No    2. Have you seen or consulted any other health care providers outside of the [address] [place] [address] since your last visit? Include any pap smears or colon screening.  No    Visit Vitals  /75 (BP 1 Location: Left upper arm, BP Patient Position: Sitting)   Pulse 92   Temp 97.9 °F (36.6 °C)   Resp 16   Ht 6' 3\" (1.905 m)   Wt 332 lb 3.2 oz (150.7 kg)   SpO2 97%   BMI 41.52 kg/m²

## 2021-03-01 NOTE — PATIENT INSTRUCTIONS
Stop the metformin for 2 weeks. Then restart the metformin at 1 pill at dinner for 1 week, then after 1 week add 1 pill at breakfast so you are taking 1 pill twice a day with meals. Remember to schedule a diabetic eye exam 
 
 
  
Counting Carbohydrates: Care Instructions Your Care Instructions You don't have to eat special foods when you have diabetes. You just have to be careful to eat healthy foods. Carbohydrates (carbs) raise blood sugar higher and quicker than any other nutrient. Carbs are found in desserts, breads and cereals, and fruit. They're also in starchy vegetables. These include potatoes, corn, and grains such as rice and pasta. Carbs are also in milk and yogurt. The more carbs you eat at one time, the higher your blood sugar will rise. Spreading carbs all through the day helps keep your blood sugar levels within your target range. Counting carbs is one of the best ways to keep your blood sugar under control. If you use insulin, counting carbs helps you match the right amount of insulin to the number of grams of carbs in a meal. Then you can change your diet and insulin dose as needed. Testing your blood sugar several times a day can help you learn how carbs affect your blood sugar. A registered dietitian or certified diabetes educator can help you plan meals and snacks. Follow-up care is a key part of your treatment and safety. Be sure to make and go to all appointments, and call your doctor if you are having problems. It's also a good idea to know your test results and keep a list of the medicines you take. How can you care for yourself at home? Know your daily amount of carbohydrates Your daily amount depends on several things, such as your weight, how active you are, which diabetes medicines you take, and what your goals are for your blood sugar levels. A registered dietitian or certified diabetes educator can help you plan how many carbs to include in each meal and snack.  
For most adults, a guideline for the daily amount of carbs is: · 45 to 60 grams at each meal. That's about the same as 3 to 4 carbohydrate servings. · 15 to 20 grams at each snack. That's about the same as 1 carbohydrate serving. Count carbs Counting carbs lets you know how much rapid-acting insulin to take before you eat. If you use an insulin pump, you get a constant rate of insulin during the day. So the pump must be programmed at meals. This gives you extra insulin to cover the rise in blood sugar after meals. If you take insulin: 
· Learn your own insulin-to-carb ratio. You and your diabetes health professional will figure out the ratio. You can do this by testing your blood sugar after meals. For example, you may need a certain amount of insulin for every 15 grams of carbs. · Add up the carb grams in a meal. Then you can figure out how many units of insulin to take based on your insulin-to-carb ratio. · Exercise lowers blood sugar. You can use less insulin than you would if you were not doing exercise. Keep in mind that timing matters. If you exercise within 1 hour after a meal, your body may need less insulin for that meal than it would if you exercised 3 hours after the meal. Test your blood sugar to find out how exercise affects your need for insulin. If you do or don't take insulin: 
· Look at labels on packaged foods. This can tell you how many carbs are in a serving. You can also use guides from the American Diabetes Association. · Be aware of portions, or serving sizes. If a package has two servings and you eat the whole package, you need to double the number of grams of carbohydrate listed for one serving. · Protein, fat, and fiber do not raise blood sugar as much as carbs do. If you eat a lot of these nutrients in a meal, your blood sugar will rise more slowly than it would otherwise. Eat from all food groups · Eat at least three meals a day.  
· Plan meals to include food from all the food groups. The food groups include grains, fruits, dairy, proteins, and vegetables. · Talk to your dietitian or diabetes educator about ways to add limited amounts of sweets into your meal plan. · If you drink alcohol, talk to your doctor. It may not be recommended when you are taking certain diabetes medicines. Where can you learn more? Go to http://www.gray.com/ Enter Y053 in the search box to learn more about \"Counting Carbohydrates: Care Instructions. \" Current as of: December 20, 2019               Content Version: 12.6 © 4188-2514 [employer]. Care instructions adapted under license by [employer] (which disclaims liability or warranty for this information). If you have questions about a medical condition or this instruction, always ask your healthcare professional. [address] any warranty or liability for your use of this information.

## 2021-03-26 ENCOUNTER — OFFICE VISIT (OUTPATIENT)
Dept: ENDOCRINOLOGY | Age: 46
End: 2021-03-26
Payer: COMMERCIAL

## 2021-03-26 VITALS
RESPIRATION RATE: 12 BRPM | WEIGHT: 315 LBS | SYSTOLIC BLOOD PRESSURE: 140 MMHG | HEART RATE: 99 BPM | DIASTOLIC BLOOD PRESSURE: 90 MMHG | BODY MASS INDEX: 41.12 KG/M2

## 2021-03-26 DIAGNOSIS — G47.33 OSA (OBSTRUCTIVE SLEEP APNEA): ICD-10-CM

## 2021-03-26 DIAGNOSIS — N52.9 ERECTILE DYSFUNCTION, UNSPECIFIED ERECTILE DYSFUNCTION TYPE: ICD-10-CM

## 2021-03-26 DIAGNOSIS — E11.69 TYPE 2 DIABETES MELLITUS WITH OTHER SPECIFIED COMPLICATION, WITHOUT LONG-TERM CURRENT USE OF INSULIN (HCC): Primary | ICD-10-CM

## 2021-03-26 DIAGNOSIS — E29.1 HYPOGONADISM IN MALE: ICD-10-CM

## 2021-03-26 PROCEDURE — 99245 OFF/OP CONSLTJ NEW/EST HI 55: CPT | Performed by: INTERNAL MEDICINE

## 2021-03-26 NOTE — PROGRESS NOTES
Chief Complaint   Patient presents with    Hypogonadism     pcp and pharmacy verified. IN [facility]     History of Present Illness: Geneva Baxter is a 39 y.o. male who I was asked to see in consult for DM, hypogonadism and ED. He was first diagnosed with DM in December 2020 he was started on Metformin. He was having issues of diarrhea with the Metformin and he is now taking Metformin ER 500mg BID, which he is tolerating much better. He is not testing his BGs. He denies symptoms of hypoglycemia. He was also started on HCTZ, Lisinopril and Atorvastatin 20mg daily. He was also started on Ergocalciferol weekly for low Vitamin D. When he presented to Dr. Jackson Kellogg, he also noted issues of ED, which pt notes has been occurring for the last 2 years. Dr. Jackson Kellogg tested his Testosterone and it was 107 in the afternoon. He denies any issues of illnesses or traumas prior to the onset of the ED. He denies hx of head trauma. He does have an umbilical hernia, but no hx of inguinal hernia or inguinal surgeries. He has sired 1 children aged 32, 21 and 15. He denies any prior testosterone or fertility issues in the past.    No hx of concussions. Most recent Hgb A1c was 7.2% in December 2020. 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  He works from 4PM-11PM  - His first meal is Lunch around 1130AM, yesterday he had lasagna, garlic bread and \"poweraid zero\". - He notes that he is not typically eating at work so he had dinner when he gets home around 11PM. Last night he had leftover lasagna. - He denies snacking at work or during the day. He notes a year ago he was weighing 352 pounds and today he is down to 329 pounds. 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 No history of vascular disease, but he does have ED.      He has not yet had his first DM eye exam.    He denies issues of heat or cold intolerance, he does note issues of fatigue and lethargy. He notes that he has had greater difficulty with heavy lifting at work. He has noted decreased libido and when he is interested in sexual activity he is not able to achieve erection. He was taking sildenafil \"that worked but I wanted to figure out what was going on to cause the ED so I stopped it. \"    He denies issues of CP, SOB, gynecomastia or galactorrhea. He denies vision changes or FOV changes/loss of peripheral vision. He denies issues of HAs. He does snore, but has never been tested for sleep apnea. Past Medical History:   Diagnosis Date    GERD (gastroesophageal reflux disease)     rare use of omeprazole    Hypertension      History reviewed. No pertinent surgical history. Current Outpatient Medications   Medication Sig    hydroCHLOROthiazide (HYDRODIURIL) 25 mg tablet TAKE 1 TABLET BY MOUTH DAILY. LABS AND FOLLOW UP VISIT ARE DUE NOW.  lisinopriL (PRINIVIL, ZESTRIL) 10 mg tablet Take 1 Tab by mouth daily.  metFORMIN ER (GLUCOPHAGE XR) 500 mg tablet 1 tab po with dinner x 7d, then 1 tab po BID with meal x 7d, then 2 tab po dinner and 1 tab po breakfast x 7d, then 2 tab po BID with meals and stay at this dose (Patient taking differently: 1 tab po with dinner x 7d, then 1 tab po BID with meal x 7d, then 2 tab po dinner and 1 tab po breakfast x 7d, then 2 tab po BID with meals and stay at this dose  Indications: taking 1 in the am and 1 in the pm)    ergocalciferol (ERGOCALCIFEROL) 1,250 mcg (50,000 unit) capsule Take 1 Cap by mouth every seven (7) days for 90 days.  atorvastatin (LIPITOR) 20 mg tablet Take 1 Tab by mouth daily. No current facility-administered medications for this visit.       No Known Allergies  Family History   Problem Relation Age of Onset    Hypertension Mother     Diabetes Father     Heart Disease Father 39        aortic valve replacement, CABG x 3    Hypertension Father     Diabetes Brother  No Known Problems Maternal Grandmother     Heart Attack Maternal Grandfather     No Known Problems Paternal Grandmother     Cancer Paternal Grandfather         stomach    Heart Disease Paternal Uncle         x2     Social History     Socioeconomic History    Marital status: UNKNOWN     Spouse name: Not on file    Number of children: Not on file    Years of education: Not on file    Highest education level: Not on file   Occupational History    Not on file   Social Needs    Financial resource strain: Not on file    Food insecurity     Worry: Not on file     Inability: Not on file   [employer] needs     Medical: Not on file     Non-medical: Not on file   Tobacco Use    Smoking status: Never Smoker    Smokeless tobacco: Never Used   Substance and Sexual Activity    Alcohol use: Yes     Alcohol/week: 1.0 standard drinks     Types: 1 Cans of beer per week     Frequency: Monthly or less     Drinks per session: 1 or 2     Binge frequency: Never     Comment: occasionally    Drug use: No    Sexual activity: Not on file   Lifestyle    Physical activity     Days per week: Not on file     Minutes per session: Not on file    Stress: Not on file   Relationships    Social connections     Talks on phone: Not on file     Gets together: Not on file     Attends Latter day service: Not on file     Active member of club or organization: Not on file     Attends meetings of clubs or organizations: Not on file     Relationship status: Not on file    Intimate partner violence     Fear of current or ex partner: Not on file     Emotionally abused: Not on file     Physically abused: Not on file     Forced sexual activity: Not on file   Other Topics Concern    Not on file   Social History Narrative    Not on file     Review of Systems:  - Negative, except as noted in HPI    Physical Examination:  Blood pressure (!) 140/90, pulse 99, resp. rate 12, weight 329 lb (149.2 kg).   - General: pleasant, no distress, good eye contact  - HEENT: no exopthalmos, no periorbital edema, no scleral/conjunctival injection, EOMI, no lid lag or stare  - Neck: supple, no thyromegaly, masses, lymph nodes, or carotid bruits, no supraclavicular or dorsocervical fat pads  - Cardiovascular: regular, normal rate, normal S1 and S2, no murmurs/rubs/gallops, 2+ dorsalis pedis pulses bilaterally  - Respiratory: clear to auscultation bilaterally  - Gastrointestinal: soft, nontender, nondistended, + umbilical hernia, no hepatosplenomegaly  - Musculoskeletal: no proximal muscle weakness in upper or lower extremities  - Integumentary: no acanthosis nigricans, no abdominal striae, no rashes, no edema, no foot ulcers, + calluses in feet bilaterally  - Neurological: intact sensation to monofilament 4/4 locations, intact vibratory sensation at great toes bilaterally,  - Psychiatric: normal mood and affect  - Pt refused genital/inguinal exam.    Data Reviewed:   Component      Latest Ref Rng & Units 12/4/2020 12/4/2020 12/4/2020 12/4/2020           3:52 PM  3:52 PM  3:52 PM  3:52 PM   Testosterone      264 - 916 ng/dL   107 (L)    Free testosterone (Direct)      6.8 - 21.5 pg/mL   8.6    Hemoglobin A1c, (calculated)      4.0 - 5.6 %  7.2 (H)     Est. average glucose      mg/dL  160     T4, Free      0.8 - 1.5 NG/DL    1.0   Vitamin D 25-Hydroxy      30 - 100 ng/mL 15.5 (L)        Component      Latest Ref Rng & Units 12/4/2020 12/4/2020 12/4/2020 12/4/2020           3:52 PM  3:52 PM  3:52 PM  3:52 PM   WBC      4.1 - 11.1 K/uL   8.3    RBC      4.10 - 5.70 M/uL   5.67    HGB      12.1 - 17.0 g/dL   15.0    HCT      36.6 - 50.3 %   44.2    MCV      80.0 - 99.0 FL   78.0 (L)    MCH      26.0 - 34.0 PG   26.5    MCHC      30.0 - 36.5 g/dL   33.9    RDW      11.5 - 14.5 %   12.8    PLATELET      609 - 624 K/uL   277    MPV      8.9 - 12.9 FL   10.7    NRBC      0  WBC   0.0    ABSOLUTE NRBC      0.00 - 0.01 K/uL   0.00    NEUTROPHILS      32 - 75 %   66 LYMPHOCYTES      12 - 49 %   21    MONOCYTES      5 - 13 %   9    EOSINOPHILS      0 - 7 %   3    BASOPHILS      0 - 1 %   1    IMMATURE GRANULOCYTES      0.0 - 0.5 %   0    ABS. NEUTROPHILS      1.8 - 8.0 K/UL   5.5    ABS. LYMPHOCYTES      0.8 - 3.5 K/UL   1.8    ABS. MONOCYTES      0.0 - 1.0 K/UL   0.8    ABS. EOSINOPHILS      0.0 - 0.4 K/UL   0.2    ABS. BASOPHILS      0.0 - 0.1 K/UL   0.1    ABS. IMM. GRANS.      0.00 - 0.04 K/UL   0.0    DF         AUTOMATED    Sodium      136 - 145 mmol/L    140   Potassium      3.5 - 5.1 mmol/L    4.0   Chloride      97 - 108 mmol/L    104   CO2      21 - 32 mmol/L    29   Anion gap      5 - 15 mmol/L    7   Glucose      65 - 100 mg/dL    149 (H)   BUN      6 - 20 MG/DL    19   Creatinine      0.70 - 1.30 MG/DL    1.06   BUN/Creatinine ratio      12 - 20      18   GFR est AA      >60 ml/min/1.73m2    >60   GFR est non-AA      >60 ml/min/1.73m2    >60   Calcium      8.5 - 10.1 MG/DL    9.3   Bilirubin, total      0.2 - 1.0 MG/DL    0.5   ALT      12 - 78 U/L    63   AST      15 - 37 U/L    24   Alk. phosphatase      45 - 117 U/L    99   Protein, total      6.4 - 8.2 g/dL    7.7   Albumin      3.5 - 5.0 g/dL    4.1   Globulin      2.0 - 4.0 g/dL    3.6   A-G Ratio      1.1 - 2.2      1.1   Cholesterol, total      <200 MG/DL  212 (H)     Triglyceride      <150 MG/DL  175 (H)     HDL Cholesterol      MG/DL  37     LDL, calculated      0 - 100 MG/DL  140 (H)     VLDL, calculated      MG/DL  35     CHOL/HDL Ratio      0.0 - 5.0    5.7 (H)     TSH      0.36 - 3.74 uIU/mL 1.52          Assessment/Plan:   1. Type 2 diabetes mellitus with other specified complication, without long-term current use of insulin ([address]    2. Hypogonadism in male    3. Erectile dysfunction, unspecified erectile dysfunction type    4. SONIA (obstructive sleep apnea)    1) Pt is tolerating the Metformin XR 500mg BID. He is not testing his BGs but is not having symptoms of hypoglycemia.  Pt has been working on weight loss and has had 25-30 pounds of weight loss. Will order an A1C and we can discuss if he needs any further treatment adjustments. Will order a urine MA. 2) His testosterone was quite low in December 2020. He denies hx of head trauma, inguinal trauma, or concussion hx. He does have symptoms of fatigue, lethargy and loss of strength as well as the ED. He has sired 3 children so that tells us he has had \"normal physiology\" in the past. We discussed that the DM, his obesity and SONIA could be underlying causes for low Testosterone. We discussed at length the Pituitary-Testicular Axis, and the function of LH on the testicles and testosterone production. Pt is NOT interested in future fertility. Will order an AM fasting FSH, LH, Testosterone, prolactin, iron profile, ferratin and PSA. He had normal CBC and TFTs in December 2020 so no need to repeat these. Since his Testosterone was so low I will also order an MRI of the pituitary. 3) See #2. 4) We discussed that SONIA could be an etiology for low testosterone and that treatment with testosterone could worsen/exacerbate SONIA. Will refer him for a sleep study. Pt voices understanding and agreement with the plan.       Copy sent to:  Dr. Tarun Kay

## 2021-04-06 DIAGNOSIS — I10 ESSENTIAL HYPERTENSION: ICD-10-CM

## 2021-04-06 DIAGNOSIS — E78.2 MIXED HYPERLIPIDEMIA: ICD-10-CM

## 2021-04-06 DIAGNOSIS — E11.9 NEW ONSET TYPE 2 DIABETES MELLITUS (HCC): ICD-10-CM

## 2021-04-06 NOTE — TELEPHONE ENCOUNTER
PCP: Suellen Snellen, PA-C    Last appt: 3/1/2021  No future appointments. Requested Prescriptions     Pending Prescriptions Disp Refills    lisinopriL (PRINIVIL, ZESTRIL) 10 mg tablet 90 Tab 0     Sig: Take 1 Tab by mouth daily.  atorvastatin (LIPITOR) 20 mg tablet 90 Tab 0     Sig: Take 1 Tab by mouth daily.        Prior labs and Blood pressures:  BP Readings from Last 3 Encounters:   03/26/21 (!) 140/90   03/01/21 125/75   01/11/21 (!) 153/89     Lab Results   Component Value Date/Time    Sodium 140 12/04/2020 03:52 PM    Potassium 4.0 12/04/2020 03:52 PM    Chloride 104 12/04/2020 03:52 PM    CO2 29 12/04/2020 03:52 PM    Anion gap 7 12/04/2020 03:52 PM    Glucose 149 (H) 12/04/2020 03:52 PM    BUN 19 12/04/2020 03:52 PM    Creatinine 1.06 12/04/2020 03:52 PM    BUN/Creatinine ratio 18 12/04/2020 03:52 PM    GFR est AA >60 12/04/2020 03:52 PM    GFR est non-AA >60 12/04/2020 03:52 PM    Calcium 9.3 12/04/2020 03:52 PM     Lab Results   Component Value Date/Time    Hemoglobin A1c 7.2 (H) 12/04/2020 03:52 PM     Lab Results   Component Value Date/Time    Cholesterol, total 212 (H) 12/04/2020 03:52 PM    HDL Cholesterol 37 12/04/2020 03:52 PM    LDL, calculated 140 (H) 12/04/2020 03:52 PM    VLDL, calculated 35 12/04/2020 03:52 PM    Triglyceride 175 (H) 12/04/2020 03:52 PM    CHOL/HDL Ratio 5.7 (H) 12/04/2020 03:52 PM     Lab Results   Component Value Date/Time    Vitamin D 25-Hydroxy 15.5 (L) 12/04/2020 03:52 PM       Lab Results   Component Value Date/Time    TSH 1.52 12/04/2020 03:52 PM

## 2021-04-07 RX ORDER — ATORVASTATIN CALCIUM 20 MG/1
20 TABLET, FILM COATED ORAL DAILY
Qty: 90 TAB | Refills: 1 | Status: SHIPPED | OUTPATIENT
Start: 2021-04-07 | End: 2021-10-01

## 2021-04-07 RX ORDER — LISINOPRIL 10 MG/1
10 TABLET ORAL DAILY
Qty: 90 TAB | Refills: 1 | Status: SHIPPED | OUTPATIENT
Start: 2021-04-07 | End: 2021-10-01

## 2021-05-11 DIAGNOSIS — E11.9 NEW ONSET TYPE 2 DIABETES MELLITUS (HCC): ICD-10-CM

## 2021-05-11 NOTE — TELEPHONE ENCOUNTER
Requested Prescriptions     Pending Prescriptions Disp Refills    metFORMIN ER (GLUCOPHAGE XR) 500 mg tablet 1080 Tab 0     Sig: TAKE 1 TABLET BY MOUTH WITH DINNER X 7DAYS, THEN 1 TAB BY MOUTH TWICE A DAY WITH MEAL X 7DAYS, THEN 2 TAB BY MOUTH DINNER AND 1 TAB BY MOUTH BREAKFAST X 7DAYS, THEN 2 TAB BY MOUTH TWICE A DAY WITH MEALS AND STAY AT THIS DOSE     Pharmacy requesting 90 day prescription om behalf of the patient. No followup scheduled at this time.

## 2021-05-11 NOTE — TELEPHONE ENCOUNTER
Please call the pt to confirm what dose of metformin he is taking. At last visit, we reduced the dose to metformin  mg, one tab BID (since he was having diarrhea on 2 tabs BID). Is he now taking one tab BID and feeling better? He should also schedule F/U appt with me for early June.

## 2021-05-14 RX ORDER — METFORMIN HYDROCHLORIDE 500 MG/1
500 TABLET, EXTENDED RELEASE ORAL 2 TIMES DAILY WITH MEALS
Qty: 180 TAB | Refills: 0 | Status: SHIPPED | OUTPATIENT
Start: 2021-05-14

## 2021-09-28 ENCOUNTER — PATIENT MESSAGE (OUTPATIENT)
Dept: INTERNAL MEDICINE CLINIC | Age: 46
End: 2021-09-28

## 2022-02-13 ENCOUNTER — HOSPITAL ENCOUNTER (EMERGENCY)
Age: 47
Discharge: HOME | End: 2022-02-13
Attending: EMERGENCY MEDICINE

## 2022-02-13 ENCOUNTER — APPOINTMENT (OUTPATIENT)
Dept: GENERAL RADIOLOGY | Age: 47
End: 2022-02-13
Attending: PHYSICIAN ASSISTANT

## 2022-02-13 VITALS
TEMPERATURE: 98.7 F | WEIGHT: 315 LBS | SYSTOLIC BLOOD PRESSURE: 155 MMHG | DIASTOLIC BLOOD PRESSURE: 88 MMHG | OXYGEN SATURATION: 98 % | HEIGHT: 75 IN | BODY MASS INDEX: 39.17 KG/M2 | RESPIRATION RATE: 14 BRPM | HEART RATE: 87 BPM

## 2022-02-13 DIAGNOSIS — M25.522 LEFT ELBOW PAIN: Primary | ICD-10-CM
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
## 2022-02-13 PROCEDURE — 73080 X-RAY EXAM OF ELBOW: CPT

## 2022-02-13 PROCEDURE — 99282 EMERGENCY DEPT VISIT SF MDM: CPT

## 2022-02-13 RX ORDER — IBUPROFEN 800 MG/1
800 TABLET ORAL
Qty: 20 TABLET | Refills: 0 | Status: SHIPPED | OUTPATIENT
Start: 2022-02-13 | End: 2022-02-20

## 2022-02-13 NOTE — ED NOTES
DOMINICK Garcia has reviewed discharge instructions with the patient. The patient verbalized understanding. Patient ambulatory out of ED at this time.

## 2022-02-13 NOTE — ED PROVIDER NOTES
EMERGENCY DEPARTMENT HISTORY AND PHYSICAL EXAM      Date: 2/13/2022  Patient Name: Gopi Jackson. History of Presenting Illness     Chief Complaint   Patient presents with    Elbow Pain     about 2 weeks ago fell on ice and hit the left elbow       History Provided By: Patient    HPI: Gopi De Paz, 55 y.o. male with a history of hypertension, diabetes and others presents ambulatory to the ED with cc of about 2weeks of 6 out of 10 constant, achy left posterior elbow pain that is worse when anything touches it. He tells me he was at home 2 weeks ago when he accidentally slipped and fell on some ice. This is when his elbow pain started. He tells me he expected the pain to improve, but since it has not his wife urged him to get it checked out. He denies any other injuries. He has been well lately without fever. He is right-hand dominant. There are no other complaints, changes, or physical findings at this time. PCP: None    Current Outpatient Medications   Medication Sig Dispense Refill    ibuprofen (MOTRIN) 800 mg tablet Take 1 Tablet by mouth every eight (8) hours as needed for Pain for up to 7 days. 20 Tablet 0    lisinopriL (PRINIVIL, ZESTRIL) 10 mg tablet Take 1 Tablet by mouth daily. Office visit/labs due before next refill 90 Tablet 0    atorvastatin (LIPITOR) 20 mg tablet Take 1 Tablet by mouth daily. Office visit/labs due before next refill 90 Tablet 0    metFORMIN ER (GLUCOPHAGE XR) 500 mg tablet Take 1 Tab by mouth two (2) times daily (with meals). 180 Tab 0    hydroCHLOROthiazide (HYDRODIURIL) 25 mg tablet TAKE 1 TABLET BY MOUTH DAILY. 80 Tab 1     Past History     Past Medical History:  Past Medical History:   Diagnosis Date    GERD (gastroesophageal reflux disease)     rare use of omeprazole    Hypertension        Past Surgical History:  No past surgical history on file.     Family History:  Family History   Problem Relation Age of Onset    Hypertension Mother    Jacob Diabetes Father     Heart Disease Father 39        aortic valve replacement, CABG x 3    Hypertension Father     Diabetes Brother     No Known Problems Maternal Grandmother     Heart Attack Maternal Grandfather     No Known Problems Paternal Grandmother     Cancer Paternal Grandfather         stomach    Heart Disease Paternal Uncle         x2       Social History:  Social History     Tobacco Use    Smoking status: Never Smoker    Smokeless tobacco: Never Used   Vaping Use    Vaping Use: Never used   Substance Use Topics    Alcohol use: Yes     Alcohol/week: 1.0 standard drink     Types: 1 Cans of beer per week     Comment: occasionally    Drug use: No       Allergies:  No Known Allergies  Review of Systems   Review of Systems   Constitutional: Negative for fever. Musculoskeletal:          Left elbow pain   All other systems reviewed and are negative. Physical Exam   Physical Exam  Vitals and nursing note reviewed. Constitutional:       General: He is not in acute distress. Appearance: He is well-developed. He is not toxic-appearing. HENT:      Head: Normocephalic and atraumatic. No right periorbital erythema or left periorbital erythema. Right Ear: External ear normal.      Left Ear: External ear normal.      Nose: Nose normal.      Mouth/Throat:      Lips: No lesions. Eyes:      General: No scleral icterus. Conjunctiva/sclera: Conjunctivae normal.      Pupils: Pupils are equal, round, and reactive to light. Cardiovascular:      Rate and Rhythm: Normal rate. Pulmonary:      Effort: Pulmonary effort is normal. No respiratory distress. Musculoskeletal:         General: Normal range of motion. Left elbow: Tenderness present. Cervical back: Normal range of motion. Comments:   LEFT ELBOW:  No bruising, redness or obvious swelling  Full active range of motion in all planes  Posterior tenderness to palpation   Skin:     Findings: No rash.    Neurological:      Mental Status: He is alert and oriented to person, place, and time. He is not disoriented. Cranial Nerves: No cranial nerve deficit. Sensory: No sensory deficit. Psychiatric:         Speech: Speech normal.       Diagnostic Study Results     Labs -   No results found for this or any previous visit (from the past 12 hour(s)). Radiologic Studies -   XR ELBOW LT MIN 3 V   Final Result   No acute abnormality. CT Results  (Last 48 hours)    None        CXR Results  (Last 48 hours)    None        Medical Decision Making   I am the first provider for this patient. I reviewed the vital signs, available nursing notes, past medical history, past surgical history, family history and social history. Vital Signs-Reviewed the patient's vital signs. Patient Vitals for the past 12 hrs:   Temp Pulse Resp BP SpO2   02/13/22 0736 98.7 °F (37.1 °C) 87 14 (!) 155/88 98 %       Pulse Oximetry Analysis - 98% on RA    Records Reviewed: Nursing Notes, Old Medical Records, Previous Radiology Studies, Previous Laboratory Studies and     Provider Notes (Medical Decision Making):   DDx: Fracture, contusion, bursitis, strain, sprain    ED Course:   Initial assessment performed. The patients presenting problems have been discussed, and they are in agreement with the care plan formulated and outlined with them. I have encouraged them to ask questions as they arise throughout their visit. Disposition:  Discharge    PLAN:  1. Current Discharge Medication List      START taking these medications    Details   ibuprofen (MOTRIN) 800 mg tablet Take 1 Tablet by mouth every eight (8) hours as needed for Pain for up to 7 days. Qty: 20 Tablet, Refills: 0  Start date: 2/13/2022, End date: 2/20/2022           2.    Follow-up Information     Follow up With Specialties Details Why Alen Cruz MD Hand Surgery Call  ORTHO: as needed if symptoms persist [address]  [address]  [id number] [phone]          Return to ED if worse     Diagnosis     Clinical Impression:   1. Left elbow pain    2.  Fall due to slipping on ice or snow, initial encounter

## 2022-03-18 PROBLEM — E66.01 OBESITY, MORBID (HCC): Status: ACTIVE | Noted: 2020-06-08

## 2022-03-19 PROBLEM — R60.9 EDEMA: Status: ACTIVE | Noted: 2021-03-01

## 2022-03-19 PROBLEM — I10 ESSENTIAL HYPERTENSION: Status: ACTIVE | Noted: 2021-03-01

## 2022-03-19 PROBLEM — E78.2 MIXED HYPERLIPIDEMIA: Status: ACTIVE | Noted: 2021-03-01

## 2022-03-20 PROBLEM — E11.9 NEW ONSET TYPE 2 DIABETES MELLITUS (HCC): Status: ACTIVE | Noted: 2021-03-01

## 2022-05-29 ENCOUNTER — HOSPITAL ENCOUNTER (EMERGENCY)
Age: 47
Discharge: HOME | End: 2022-05-29
Attending: EMERGENCY MEDICINE

## 2022-05-29 VITALS
DIASTOLIC BLOOD PRESSURE: 86 MMHG | OXYGEN SATURATION: 98 % | WEIGHT: 315 LBS | RESPIRATION RATE: 20 BRPM | TEMPERATURE: 98.2 F | HEART RATE: 94 BPM | HEIGHT: 75 IN | SYSTOLIC BLOOD PRESSURE: 130 MMHG | BODY MASS INDEX: 39.17 KG/M2

## 2022-05-29 DIAGNOSIS — R13.19 ESOPHAGEAL DYSPHAGIA: Primary | ICD-10-CM

## 2022-05-29 DIAGNOSIS — K56.699 FOOD BOLUS OBSTRUCTION OF INTESTINE (HCC): ICD-10-CM

## 2022-05-29 NOTE — ED PROVIDER NOTES
EMERGENCY DEPARTMENT HISTORY AND PHYSICAL EXAM      Please note that this dictation was completed with the assistance of \"Dragon\", the computer voice recognition software. Quite often unanticipated grammatical, syntax, homophones, and other interpretive errors are inadvertently transcribed by the computer software. Please disregard these errors and any errors that have escaped final proofreading. Thank you. Patient: Funmilayo Schulz. DOS: 22  : 1975  MRN: [id number]  History of Presenting Illness     Chief Complaint   Patient presents with    Other     pt presents with c/o feeling like something is stuck in esophagus. Pt states ate dinner around 1900 last night with feeling of something lodged in throat since then, unable to keep fluid and food down. Pt airway intact, speaking in complete sentences. 98% on room air. History Provided By: Patient/family/EMS (if applicable)    HPI: Funmilayo Schulz., 55 y.o. male with past medical history as documented below presents to the ED with c/o of concerns for food bolus impaction in esophagus. Pt states that around dinner at 7PM, pt was eating meatballs and felt like it got stuck his esophagus. He states since then he's been spitting ups secretions and unable to keep anything down. He has tried home remedies without relief. No prior hx of esophageal problems. Pt denies any other exacerbating or ameliorating factors. Additionally, pt specifically denies any recent fever, chills, headache, nausea, vomiting, abdominal pain, CP, SOB, lightheadedness, dizziness, numbness, weakness, lower extremity swelling, heart palpitations, urinary sxs, diarrhea, constipation, melena, hematochezia, cough, or congestion. There are no other complaints, changes or physical findings pertinent to the HPI at this time.     PCP: None  Past History   Past Medical History:  Past Medical History:   Diagnosis Date    GERD (gastroesophageal reflux disease)     rare use of omeprazole    Hypertension        Past Surgical History:  History reviewed. No pertinent surgical history. Family History:   Family history reviewed and was non-contributory, unless specified below:  Family History   Problem Relation Age of Onset    Hypertension Mother     Diabetes Father     Heart Disease Father 39        aortic valve replacement, CABG x 3    Hypertension Father     Diabetes Brother     No Known Problems Maternal Grandmother     Heart Attack Maternal Grandfather     No Known Problems Paternal Grandmother     Cancer Paternal Grandfather         stomach    Heart Disease Paternal Uncle         x2       Social History:  Social History     Tobacco Use    Smoking status: Never Smoker    Smokeless tobacco: Never Used   Vaping Use    Vaping Use: Never used   Substance Use Topics    Alcohol use: Yes     Alcohol/week: 1.0 standard drink     Types: 1 Cans of beer per week     Comment: occasionally    Drug use: No       Allergies:  No Known Allergies    Current Medications:  No current facility-administered medications on file prior to encounter. Current Outpatient Medications on File Prior to Encounter   Medication Sig Dispense Refill    lisinopriL (PRINIVIL, ZESTRIL) 10 mg tablet Take 1 Tablet by mouth daily. Office visit/labs due before next refill 90 Tablet 0    atorvastatin (LIPITOR) 20 mg tablet Take 1 Tablet by mouth daily. Office visit/labs due before next refill 90 Tablet 0    metFORMIN ER (GLUCOPHAGE XR) 500 mg tablet Take 1 Tab by mouth two (2) times daily (with meals). 180 Tab 0    hydroCHLOROthiazide (HYDRODIURIL) 25 mg tablet TAKE 1 TABLET BY MOUTH DAILY. 90 Tab 1     Review of Systems   A complete ROS was reviewed by me today and all other systems negative, unless otherwise specified below:  Review of Systems   Constitutional: Negative. Negative for chills and fever. HENT: Positive for trouble swallowing. Negative for congestion and sore throat. Eyes: Negative. Respiratory: Negative. Negative for cough, chest tightness, shortness of breath and wheezing. Cardiovascular: Negative. Negative for chest pain, palpitations and leg swelling. Gastrointestinal: Negative. Negative for abdominal distention, abdominal pain, blood in stool, constipation, diarrhea, nausea and vomiting. Endocrine: Negative. Genitourinary: Negative. Negative for dysuria, flank pain, frequency, hematuria and urgency. Musculoskeletal: Negative. Negative for arthralgias, back pain and myalgias. Skin: Negative. Negative for color change and rash. Neurological: Negative. Negative for dizziness, syncope, speech difficulty, weakness, light-headedness, numbness and headaches. Hematological: Negative. Psychiatric/Behavioral: Negative. Negative for confusion and self-injury. The patient is not nervous/anxious. All other systems reviewed and are negative. Physical Exam   Physical Exam  Vitals and nursing note reviewed. Constitutional:       Appearance: He is well-developed. He is not toxic-appearing. HENT:      Head: Normocephalic and atraumatic. Comments: No foreign body in posterior pharynx visualized  Tolerating secretions     Mouth/Throat:      Pharynx: No posterior oropharyngeal erythema. Eyes:      Conjunctiva/sclera: Conjunctivae normal.      Pupils: Pupils are equal, round, and reactive to light. Cardiovascular:      Rate and Rhythm: Normal rate and regular rhythm. Heart sounds: Normal heart sounds. No murmur heard. No friction rub. No gallop. Pulmonary:      Effort: Pulmonary effort is normal. No respiratory distress. Breath sounds: Normal breath sounds. No wheezing or rales. Chest:      Chest wall: No tenderness. Abdominal:      General: Bowel sounds are normal. There is no distension. Palpations: Abdomen is soft. There is no mass. Tenderness: There is no abdominal tenderness. There is no guarding or rebound.    Musculoskeletal: General: No tenderness or deformity. Normal range of motion. Cervical back: Normal range of motion. Skin:     General: Skin is warm. Findings: No rash. Neurological:      Mental Status: He is alert and oriented to person, place, and time. Cranial Nerves: No cranial nerve deficit. Motor: No abnormal muscle tone. Coordination: Coordination normal.      Deep Tendon Reflexes: Reflexes normal.   Psychiatric:         Behavior: Behavior is cooperative. Diagnostic Study Results     Laboratory Data  I have personally reviewed and interpreted all available laboratory results. No results found for this or any previous visit (from the past 24 hour(s)). Radiologic Studies   I have personally reviewed and interpreted all available imaging studies and agree with radiology interpretation. No orders to display     CT Results  (Last 48 hours)    None        CXR Results  (Last 48 hours)    None        Medical Decision Making   I am the first and primary ED physician for this patient's ED visit today. I reviewed our electronic medical record system for any past medical records that may contribute to the patient's current condition, including their past medical history, surgical history, social and family history. This also includes their most recent ED visits, previous hospitalizations and prior diagnostic data. I have reviewed and summarized the most pertinent findings in my HPI and MDM.     Vital Signs Reviewed:  Patient Vitals for the past 24 hrs:   Temp Pulse Resp BP SpO2   05/29/22 0136 -- -- -- -- 98 %   05/29/22 0128 98.2 °F (36.8 °C) 94 20 130/86 98 %     Pulse Oximetry Analysis: 98% on RA    Cardiac Monitor:   Rate: 94 bpm  The cardiac monitor revealed the following rhythm as interpreted by me: Normal Sinus Rhythm  Cardiac monitoring was ordered to monitor patient for signs of cardiac dysrhythmia, which they are at risk for based on their history and/or risk for cardiovascular disease and/or metabolic abnormalities. Records Reviewed: Nursing Notes, Old Medical Records, Previous electrocardiograms, Previous Radiology Studies and Previous Laboratory Studies, EMS reports    Provider Notes (Medical Decision Making): Suspect food bolus in esophagus; stable vitals and protecting airway; pt given 2 cups of soda and was asked to jump up and land on his heels in an attempt to dislodge food bolus. Pt was able to do this successfully and stated the food bolus went into his stomach. He tolerated PO without issues. Can defer glucagon/EZ gas, will refer to GI as outpatient. Pt to continue on soft diet, PPI. ED Course:   Initial assessment performed. I discussed presenting problems and concerns, and my formulated plan for today's visit with the patient and any available family members. I have encouraged them to ask questions as they arise throughout the visit. Social History     Tobacco Use    Smoking status: Never Smoker    Smokeless tobacco: Never Used   Vaping Use    Vaping Use: Never used   Substance Use Topics    Alcohol use: Yes     Alcohol/week: 1.0 standard drink     Types: 1 Cans of beer per week     Comment: occasionally    Drug use: No       ED Orders Placed:  Orders Placed This Encounter    DISCONTD: glucagon (GLUCAGEN) injection 1 mg    DISCONTD: sod bicarb-citric ac-simeth (EZ GAS II) 2.21-1.53 gram/4 gram contrast packet 4 g       ED Medications Administered During ED Course:  Medications - No data to display     Progress Note:  I have just re-evaluated the patient. Patient reports improvement of sx's. I have reviewed His vital signs and determined there is currently no worsening in their condition or physical exam. Results have been reviewed with them and their questions have been answered. We will continue to review further results as they come available. Progress Note:  I have re-examined the patient. Pt states he feels much better and symptoms improved. Tolerating oral intake. Abdomen is soft and without guarding, rebound or other peritoneal signs. I have discussed with patient the importance of close f/u and to return to the ED if symptoms don't improve or worsen. Progress Note:  Pt reassessed and symptoms noted to have improved significantly after ED treatment. Pt is clinically stable for discharge. Andrew Nguyen Jr.'s labs and imaging have been reviewed with him and available family. He verbally conveys understanding and agreement of the signs, symptoms, diagnosis, treatment and prognosis and additionally agrees to follow up as recommended with Dr. None and/or specialist as instructed. He agrees with the care plan we have created and conveys that all of his questions have been answered. Additionally, I have put together a packet of discharge instructions for him that include: 1) educational information regarding their diagnosis, 2) how to care for their diagnosis at home, as well a 3) list of reasons why they would want to return to the ED prior to their follow-up appointment should the patient's condition change or symptoms worsen. I have answered all questions to the patient's satisfaction. Strict return precautions given. He conveyed understanding and agreement with care plan. Vital signs stable for discharge. Disposition:  DISCHARGE  The pt is ready for discharge. The pt's signs, symptoms, diagnosis, and discharge instructions have been discussed and pt has conveyed their understanding. The pt is to follow up as recommended or return to ER should their symptoms worsen. Plan has been discussed and pt is in agreement. Plan:  1. Return precautions as discussed with patient and available family/caregiver. 2.   Discharge Medication List as of 5/29/2022  1:36 AM      No current facility-administered medications for this encounter. Current Outpatient Medications:     lisinopriL (PRINIVIL, ZESTRIL) 10 mg tablet, Take 1 Tablet by mouth daily. Office visit/labs due before next refill, Disp: 90 Tablet, Rfl: 0    atorvastatin (LIPITOR) 20 mg tablet, Take 1 Tablet by mouth daily. Office visit/labs due before next refill, Disp: 90 Tablet, Rfl: 0    metFORMIN ER (GLUCOPHAGE XR) 500 mg tablet, Take 1 Tab by mouth two (2) times daily (with meals). , Disp: 180 Tab, Rfl: 0    hydroCHLOROthiazide (HYDRODIURIL) 25 mg tablet, TAKE 1 TABLET BY MOUTH DAILY. , Disp: 90 Tab, Rfl: 1    3. Follow-up Information     Follow up With Specialties Details Why Contact Info    [facility] EMERGENCY DEPT Emergency Medicine  As needed, If symptoms worsen [address] [place] [address]  [phone]        Instructed to return to ED if worse  Diagnosis   Clinical Impression:  1. Esophageal dysphagia    2. Food bolus obstruction of intestine (HCC)      Attestation:  Mason Cabrera MD, am the attending of record for this patient. I personally performed the services described in this documentation on this date, 5/29/2022 for patient, Albaro Knutson. . I have reviewed the chart and verified that the record is accurate and complete.

## 2022-05-29 NOTE — ED TRIAGE NOTES
.  Chief Complaint   Patient presents with    Other     pt presents with c/o feeling like something is stuck in esophagus. Pt states ate dinner around 1900 last night with feeling of something lodged in throat since then, unable to keep fluid and food down. Pt airway intact, speaking in complete sentences. 98% on room air.

## 2022-05-29 NOTE — DISCHARGE INSTRUCTIONS
Thank You! It was a pleasure taking care of you in our Emergency Department today. We know that when you come to [facility], you are entrusting us with your health, comfort, and safety. Our physicians and nurses honor that trust, and truly appreciate the opportunity to care for you and your loved ones. We also value your feedback. If you receive a survey about your Emergency Department experience today, please fill it out. We care about our patients' feedback, and we listen to what you have to say. Thank you. Dr. Foreign Garza M.D.      ________________________________________________________________________  I have included a copy of your lab results and/or radiologic studies from today's visit so you can have them easily available at your follow-up visit. We hope you feel better and please do not hesitate to contact the ED if you have any questions at all! No results found for this or any previous visit (from the past 12 hour(s)). No orders to display     CT Results  (Last 48 hours)      None          The exam and treatment you received in the Emergency Department were for an urgent problem and are not intended as complete care. It is important that you follow up with a doctor, nurse practitioner, or physician assistant for ongoing care. If your symptoms become worse or you do not improve as expected and you are unable to reach your usual health care provider, you should return to the Emergency Department. We are available 24 hours a day. Please take your discharge instructions with you when you go to your follow-up appointment. If a prescription has been provided, please have it filled as soon as possible to prevent a delay in treatment. Read the entire medication instruction sheet provided to you by the pharmacy.  If you have any questions or reservations about taking the medication due to side effects or interactions with other medications, please call your primary care physician or contact the ER to speak with the charge nurse. Please make an appointment with your family doctor or the physician you were referred to for follow-up of this visit as instructed on your discharge paperwork. Return to the ER if you are unable to be seen or if you are unable to be seen in a timely manner. If you have any problem arranging the follow-up visit, contact the Emergency Department immediately.

## 2023-08-07 ENCOUNTER — APPOINTMENT (OUTPATIENT)
Facility: HOSPITAL | Age: 48
End: 2023-08-07

## 2023-08-07 ENCOUNTER — HOSPITAL ENCOUNTER (EMERGENCY)
Facility: HOSPITAL | Age: 48
Discharge: HOME | End: 2023-08-07

## 2023-08-07 VITALS
SYSTOLIC BLOOD PRESSURE: 142 MMHG | OXYGEN SATURATION: 96 % | DIASTOLIC BLOOD PRESSURE: 93 MMHG | RESPIRATION RATE: 16 BRPM | HEART RATE: 96 BPM | BODY MASS INDEX: 41.64 KG/M2 | WEIGHT: 315 LBS | TEMPERATURE: 97.9 F

## 2023-08-07 DIAGNOSIS — S39.012A STRAIN OF LUMBAR REGION, INITIAL ENCOUNTER: ICD-10-CM

## 2023-08-07 DIAGNOSIS — M62.838 SPASM OF MUSCLE: Primary | ICD-10-CM

## 2023-08-07 LAB
APPEARANCE UR: CLEAR
BACTERIA URNS QL MICRO: NEGATIVE /HPF
BILIRUB UR QL: NEGATIVE
COLOR UR: ABNORMAL
EPITH CASTS URNS QL MICRO: ABNORMAL /LPF
GLUCOSE UR STRIP.AUTO-MCNC: NEGATIVE MG/DL
HGB UR QL STRIP: NEGATIVE
HYALINE CASTS URNS QL MICRO: ABNORMAL /LPF (ref 0–2)
KETONES UR QL STRIP.AUTO: NEGATIVE MG/DL
LEUKOCYTE ESTERASE UR QL STRIP.AUTO: NEGATIVE
NITRITE UR QL STRIP.AUTO: NEGATIVE
PH UR STRIP: 6 (ref 5–8)
PROT UR STRIP-MCNC: ABNORMAL MG/DL
RBC #/AREA URNS HPF: ABNORMAL /HPF (ref 0–5)
SP GR UR REFRACTOMETRY: 1.02
URINE CULTURE IF INDICATED: ABNORMAL
UROBILINOGEN UR QL STRIP.AUTO: 1 EU/DL (ref 0.2–1)
WBC URNS QL MICRO: ABNORMAL /HPF (ref 0–4)

## 2023-08-07 PROCEDURE — 99284 EMERGENCY DEPT VISIT MOD MDM: CPT

## 2023-08-07 PROCEDURE — 6370000000 HC RX 637 (ALT 250 FOR IP)

## 2023-08-07 PROCEDURE — 81001 URINALYSIS AUTO W/SCOPE: CPT

## 2023-08-07 PROCEDURE — 72100 X-RAY EXAM L-S SPINE 2/3 VWS: CPT

## 2023-08-07 PROCEDURE — 96372 THER/PROPH/DIAG INJ SC/IM: CPT

## 2023-08-07 PROCEDURE — 6360000002 HC RX W HCPCS

## 2023-08-07 RX ORDER — METHOCARBAMOL 750 MG/1
750 TABLET, FILM COATED ORAL 4 TIMES DAILY
Qty: 40 TABLET | Refills: 0 | Status: SHIPPED | OUTPATIENT
Start: 2023-08-07 | End: 2023-08-17

## 2023-08-07 RX ORDER — DICLOFENAC SODIUM 75 MG/1
75 TABLET, DELAYED RELEASE ORAL 2 TIMES DAILY
Qty: 30 TABLET | Refills: 0 | Status: SHIPPED | OUTPATIENT
Start: 2023-08-07 | End: 2023-08-22

## 2023-08-07 RX ORDER — KETOROLAC TROMETHAMINE 30 MG/ML
15 INJECTION, SOLUTION INTRAMUSCULAR; INTRAVENOUS ONCE
Status: COMPLETED | OUTPATIENT
Start: 2023-08-07 | End: 2023-08-07

## 2023-08-07 RX ORDER — METHOCARBAMOL 750 MG/1
750 TABLET, FILM COATED ORAL ONCE
Status: COMPLETED | OUTPATIENT
Start: 2023-08-07 | End: 2023-08-07

## 2023-08-07 RX ADMIN — METHOCARBAMOL TABLETS 750 MG: 750 TABLET, COATED ORAL at 07:51

## 2023-08-07 RX ADMIN — KETOROLAC TROMETHAMINE 15 MG: 30 INJECTION, SOLUTION INTRAMUSCULAR; INTRAVENOUS at 07:51

## 2023-08-07 ASSESSMENT — PAIN SCALES - GENERAL: PAINLEVEL_OUTOF10: 7

## 2023-08-07 NOTE — ED PROVIDER NOTES
patient is stable for discharge home. The signs, symptoms, diagnosis, and discharge instructions have been discussed, understanding conveyed, and agreed upon. The patient is to follow up as recommended or return to ER should their symptoms worsen. PATIENT REFERRED TO:  [address] [place] [address]  [phone]  Schedule an appointment as soon as possible for a visit       [address]  [address] [facility]  [phone]  Schedule an appointment as soon as possible for a visit          DISCHARGE MEDICATIONS:     Medication List        START taking these medications      diclofenac 75 MG EC tablet  Commonly known as: VOLTAREN  Take 1 tablet by mouth 2 times daily for 15 days     methocarbamol 750 MG tablet  Commonly known as: Robaxin-750  Take 1 tablet by mouth 4 times daily for 10 days            ASK your doctor about these medications      atorvastatin 20 MG tablet  Commonly known as: LIPITOR     hydroCHLOROthiazide 25 MG tablet  Commonly known as: HYDRODIURIL     lisinopril 10 MG tablet  Commonly known as: PRINIVIL;ZESTRIL     metFORMIN 500 MG extended release tablet  Commonly known as: Michelle Sanchez               Where to Get Your Medications        These medications were sent to [address] Claudean Miyamoto, [address] [address] [facility],[address] [phone] [facility] [phone]  [address] [facility], [address] [facility] [address]      Phone: [phone]   diclofenac 75 MG EC tablet  methocarbamol 750 MG tablet           DISCONTINUED MEDICATIONS:  Discharge Medication List as of 8/7/2023  8:41 AM          I have seen and evaluated the patient autonomously. My supervision physician was on site and available for consultation if needed. I am the Primary Clinician of Record. EMI Manriquez CNP (electronically signed)    (Please note that parts of this dictation were completed with voice recognition software.  Quite often

## 2023-08-08 ASSESSMENT — ENCOUNTER SYMPTOMS
BACK PAIN: 1
CHEST TIGHTNESS: 0
NAUSEA: 0
VOMITING: 0
GASTROINTESTINAL NEGATIVE: 1